# Patient Record
Sex: FEMALE | Race: BLACK OR AFRICAN AMERICAN | Employment: FULL TIME | URBAN - NONMETROPOLITAN AREA
[De-identification: names, ages, dates, MRNs, and addresses within clinical notes are randomized per-mention and may not be internally consistent; named-entity substitution may affect disease eponyms.]

---

## 2017-03-12 ENCOUNTER — NURSE TRIAGE (OUTPATIENT)
Dept: ADMINISTRATIVE | Age: 27
End: 2017-03-12

## 2017-07-20 ENCOUNTER — HOSPITAL ENCOUNTER (EMERGENCY)
Age: 27
Discharge: HOME | End: 2017-07-20
Payer: COMMERCIAL

## 2017-07-20 VITALS
BODY MASS INDEX: 29.82 KG/M2 | WEIGHT: 190 LBS | HEIGHT: 67 IN | HEART RATE: 81 BPM | SYSTOLIC BLOOD PRESSURE: 122 MMHG | OXYGEN SATURATION: 100 % | TEMPERATURE: 98.6 F | RESPIRATION RATE: 16 BRPM | DIASTOLIC BLOOD PRESSURE: 59 MMHG

## 2017-07-20 DIAGNOSIS — J01.00 ACUTE NON-RECURRENT MAXILLARY SINUSITIS: Primary | ICD-10-CM

## 2017-07-20 PROCEDURE — 99213 OFFICE O/P EST LOW 20 MIN: CPT | Performed by: NURSE PRACTITIONER

## 2017-07-20 PROCEDURE — 99214 OFFICE O/P EST MOD 30 MIN: CPT

## 2017-07-20 RX ORDER — AMOXICILLIN 875 MG/1
875 TABLET, COATED ORAL 2 TIMES DAILY
Qty: 20 TABLET | Refills: 0 | Status: SHIPPED | OUTPATIENT
Start: 2017-07-20 | End: 2017-07-30

## 2017-07-20 RX ORDER — FLUTICASONE PROPIONATE 50 MCG
2 SPRAY, SUSPENSION (ML) NASAL DAILY
Qty: 1 BOTTLE | Refills: 0 | Status: SHIPPED | OUTPATIENT
Start: 2017-07-20 | End: 2019-05-21

## 2017-07-20 ASSESSMENT — ENCOUNTER SYMPTOMS
EYE DISCHARGE: 0
BACK PAIN: 0
EYE ITCHING: 0
COUGH: 0
SWOLLEN GLANDS: 0
VOMITING: 0
DIARRHEA: 0
SHORTNESS OF BREATH: 0
CHEST TIGHTNESS: 0
TROUBLE SWALLOWING: 0
RHINORRHEA: 1
SINUS PRESSURE: 1
WHEEZING: 0
NAUSEA: 0
ABDOMINAL PAIN: 0
EYE REDNESS: 0
SORE THROAT: 0
VOICE CHANGE: 0

## 2017-07-20 ASSESSMENT — PAIN SCALES - GENERAL: PAINLEVEL_OUTOF10: 6

## 2017-07-20 ASSESSMENT — PAIN DESCRIPTION - LOCATION: LOCATION: FACE

## 2017-08-14 ENCOUNTER — APPOINTMENT (OUTPATIENT)
Dept: GENERAL RADIOLOGY | Age: 27
End: 2017-08-14
Payer: COMMERCIAL

## 2017-08-14 ENCOUNTER — HOSPITAL ENCOUNTER (EMERGENCY)
Age: 27
Discharge: HOME | End: 2017-08-15
Payer: COMMERCIAL

## 2017-08-14 VITALS
RESPIRATION RATE: 16 BRPM | HEIGHT: 67 IN | WEIGHT: 190 LBS | OXYGEN SATURATION: 100 % | DIASTOLIC BLOOD PRESSURE: 82 MMHG | TEMPERATURE: 98.7 F | HEART RATE: 80 BPM | SYSTOLIC BLOOD PRESSURE: 123 MMHG | BODY MASS INDEX: 29.82 KG/M2

## 2017-08-14 DIAGNOSIS — S61.219A LACERATION OF FINGER, INITIAL ENCOUNTER: Primary | ICD-10-CM

## 2017-08-14 PROCEDURE — 12001 RPR S/N/AX/GEN/TRNK 2.5CM/<: CPT

## 2017-08-14 PROCEDURE — 73130 X-RAY EXAM OF HAND: CPT

## 2017-08-14 PROCEDURE — 99283 EMERGENCY DEPT VISIT LOW MDM: CPT

## 2017-08-14 PROCEDURE — 96372 THER/PROPH/DIAG INJ SC/IM: CPT

## 2017-08-14 PROCEDURE — 6360000002 HC RX W HCPCS: Performed by: PHYSICIAN ASSISTANT

## 2017-08-14 RX ORDER — CEPHALEXIN 500 MG/1
500 CAPSULE ORAL 4 TIMES DAILY
Qty: 40 CAPSULE | Refills: 0 | Status: SHIPPED | OUTPATIENT
Start: 2017-08-14 | End: 2017-08-24

## 2017-08-14 RX ORDER — HYDROCODONE BITARTRATE AND ACETAMINOPHEN 5; 325 MG/1; MG/1
1 TABLET ORAL EVERY 6 HOURS PRN
Qty: 12 TABLET | Refills: 0 | Status: SHIPPED | OUTPATIENT
Start: 2017-08-14 | End: 2017-08-21

## 2017-08-14 RX ORDER — CEFAZOLIN SODIUM 1 G/3ML
1 INJECTION, POWDER, FOR SOLUTION INTRAMUSCULAR; INTRAVENOUS ONCE
Status: COMPLETED | OUTPATIENT
Start: 2017-08-15 | End: 2017-08-14

## 2017-08-14 RX ADMIN — CEFAZOLIN SODIUM 1 G: 1 INJECTION, POWDER, FOR SOLUTION INTRAMUSCULAR; INTRAVENOUS at 23:51

## 2017-08-14 ASSESSMENT — PAIN DESCRIPTION - ORIENTATION: ORIENTATION: RIGHT

## 2017-08-14 ASSESSMENT — ENCOUNTER SYMPTOMS
VOMITING: 0
EYE PAIN: 0
RHINORRHEA: 0
NAUSEA: 0
DIARRHEA: 0
COUGH: 0
SHORTNESS OF BREATH: 0
BACK PAIN: 0
EYE DISCHARGE: 0
ABDOMINAL PAIN: 0
SORE THROAT: 0
WHEEZING: 0

## 2017-08-14 ASSESSMENT — PAIN DESCRIPTION - DESCRIPTORS: DESCRIPTORS: ACHING

## 2017-08-14 ASSESSMENT — PAIN DESCRIPTION - FREQUENCY: FREQUENCY: CONTINUOUS

## 2017-08-14 ASSESSMENT — PAIN SCALES - GENERAL: PAINLEVEL_OUTOF10: 7

## 2017-08-14 ASSESSMENT — PAIN DESCRIPTION - LOCATION: LOCATION: HAND

## 2017-08-15 PROCEDURE — A6222 GAUZE <=16 IN NO W/SAL W/O B: HCPCS

## 2017-08-15 PROCEDURE — A6447 CONFORM BAND S W >=5"/YD: HCPCS

## 2019-01-10 ENCOUNTER — HOSPITAL ENCOUNTER (OUTPATIENT)
Age: 29
Setting detail: SPECIMEN
Discharge: HOME | End: 2019-01-10

## 2019-01-11 LAB
DIRECT EXAM: ABNORMAL
Lab: ABNORMAL
SPECIMEN DESCRIPTION: ABNORMAL
STATUS: ABNORMAL

## 2019-01-14 LAB
C. TRACHOMATIS DNA ,URINE: NEGATIVE
N. GONORRHOEAE DNA, URINE: NEGATIVE

## 2019-04-10 ENCOUNTER — HOSPITAL ENCOUNTER (EMERGENCY)
Age: 29
Discharge: HOME | End: 2019-04-10

## 2019-04-10 VITALS
SYSTOLIC BLOOD PRESSURE: 128 MMHG | OXYGEN SATURATION: 99 % | HEART RATE: 115 BPM | BODY MASS INDEX: 32.58 KG/M2 | TEMPERATURE: 100.9 F | DIASTOLIC BLOOD PRESSURE: 72 MMHG | WEIGHT: 208 LBS | RESPIRATION RATE: 18 BRPM

## 2019-04-10 DIAGNOSIS — Z20.818 EXPOSURE TO STREP THROAT: ICD-10-CM

## 2019-04-10 DIAGNOSIS — J10.1 INFLUENZA A: Primary | ICD-10-CM

## 2019-04-10 LAB
FLU A ANTIGEN: POSITIVE
INFLUENZA B AG, EIA: NEGATIVE

## 2019-04-10 PROCEDURE — 87804 INFLUENZA ASSAY W/OPTIC: CPT

## 2019-04-10 PROCEDURE — 99214 OFFICE O/P EST MOD 30 MIN: CPT | Performed by: NURSE PRACTITIONER

## 2019-04-10 PROCEDURE — 99213 OFFICE O/P EST LOW 20 MIN: CPT

## 2019-04-10 RX ORDER — ONDANSETRON 4 MG/1
4 TABLET, FILM COATED ORAL EVERY 8 HOURS PRN
Qty: 15 TABLET | Refills: 0 | Status: SHIPPED | OUTPATIENT
Start: 2019-04-10 | End: 2019-05-21

## 2019-04-10 RX ORDER — OSELTAMIVIR PHOSPHATE 75 MG/1
75 CAPSULE ORAL 2 TIMES DAILY
Qty: 10 CAPSULE | Refills: 0 | Status: SHIPPED | OUTPATIENT
Start: 2019-04-10 | End: 2019-04-10 | Stop reason: SDUPTHER

## 2019-04-10 RX ORDER — OSELTAMIVIR PHOSPHATE 75 MG/1
75 CAPSULE ORAL 2 TIMES DAILY
Qty: 10 CAPSULE | Refills: 0 | Status: SHIPPED | OUTPATIENT
Start: 2019-04-10 | End: 2019-04-15

## 2019-04-10 RX ORDER — BENZONATATE 200 MG/1
200 CAPSULE ORAL 3 TIMES DAILY PRN
Qty: 21 CAPSULE | Refills: 0 | Status: SHIPPED | OUTPATIENT
Start: 2019-04-10 | End: 2019-04-17

## 2019-04-10 RX ORDER — FLUTICASONE PROPIONATE 50 MCG
1 SPRAY, SUSPENSION (ML) NASAL DAILY
Qty: 1 BOTTLE | Refills: 0 | Status: SHIPPED | OUTPATIENT
Start: 2019-04-10 | End: 2019-05-21

## 2019-04-10 RX ORDER — AMOXICILLIN 500 MG/1
500 CAPSULE ORAL 2 TIMES DAILY
Qty: 20 CAPSULE | Refills: 0 | Status: SHIPPED | OUTPATIENT
Start: 2019-04-10 | End: 2019-04-20

## 2019-04-10 ASSESSMENT — ENCOUNTER SYMPTOMS
RHINORRHEA: 1
VOICE CHANGE: 0
NAUSEA: 1
COUGH: 1
CHEST TIGHTNESS: 0
VOMITING: 0
SORE THROAT: 1
STRIDOR: 0
TROUBLE SWALLOWING: 0
SINUS PRESSURE: 0
ANAL BLEEDING: 0
WHEEZING: 0
SHORTNESS OF BREATH: 0

## 2019-04-10 ASSESSMENT — PAIN DESCRIPTION - PROGRESSION: CLINICAL_PROGRESSION: GRADUALLY WORSENING

## 2019-04-10 ASSESSMENT — PAIN SCALES - GENERAL: PAINLEVEL_OUTOF10: 2

## 2019-04-10 ASSESSMENT — PAIN - FUNCTIONAL ASSESSMENT: PAIN_FUNCTIONAL_ASSESSMENT: ACTIVITIES ARE NOT PREVENTED

## 2019-04-10 ASSESSMENT — PAIN DESCRIPTION - FREQUENCY: FREQUENCY: CONTINUOUS

## 2019-04-10 ASSESSMENT — PAIN DESCRIPTION - PAIN TYPE: TYPE: ACUTE PAIN

## 2019-04-10 ASSESSMENT — PAIN DESCRIPTION - LOCATION: LOCATION: THROAT

## 2019-04-10 ASSESSMENT — PAIN DESCRIPTION - ONSET: ONSET: GRADUAL

## 2019-04-10 ASSESSMENT — PAIN DESCRIPTION - DESCRIPTORS: DESCRIPTORS: ACHING

## 2019-04-10 NOTE — ED PROVIDER NOTES
mouth daily      !! fluticasone (FLONASE) 50 MCG/ACT nasal spray 2 sprays by Nasal route daily Apply daily to each nare, Disp-1 Bottle, R-0Normal       !! - Potential duplicate medications found. Please discuss with provider. ALLERGIES     Patient is has No Known Allergies. FAMILY HISTORY     Patient's family history includes High Blood Pressure in her father. SOCIAL HISTORY     Patient  reports that she quit smoking about 6 years ago. Her smoking use included cigarettes. She has never used smokeless tobacco. She reports that she does not drink alcohol or use drugs. PHYSICAL EXAM     ED TRIAGE VITALS  BP: 128/72, Temp: 100.9 °F (38.3 °C), Pulse: 115, Resp: 18, SpO2: 99 %  Physical Exam   Constitutional: She is oriented to person, place, and time. She appears well-developed and well-nourished. Non-toxic appearance. She appears ill. No distress. HENT:   Head: Normocephalic and atraumatic. Head is without right periorbital erythema and without left periorbital erythema. Right Ear: Hearing, tympanic membrane, external ear and ear canal normal.   Left Ear: Hearing, tympanic membrane, external ear and ear canal normal.   Nose: Nose normal. Right sinus exhibits no maxillary sinus tenderness and no frontal sinus tenderness. Left sinus exhibits no maxillary sinus tenderness and no frontal sinus tenderness. Mouth/Throat: Uvula is midline and mucous membranes are normal. No trismus in the jaw. No uvula swelling. Posterior oropharyngeal erythema (mild) present. No oropharyngeal exudate, posterior oropharyngeal edema or tonsillar abscesses. Tonsils are 2+ on the right. Tonsils are 2+ on the left. No tonsillar exudate. Neck: Normal range of motion. Neck supple. Cardiovascular: S1 normal, S2 normal and normal heart sounds. Tachycardia present. Exam reveals no gallop and no friction rub. No murmur heard. Pulmonary/Chest: Effort normal and breath sounds normal. No accessory muscle usage or stridor.  No symptoms persist or if develops new symptoms such as wheezing or shortness of breath. PATIENT REFERRED TO:  [facility]  [address]  [phone]    As needed    Patient instructed to follow up with PCP. If symptoms worsen, become severe or new symptoms develop patient instructedto go to the emergency room immediately. DISCHARGE MEDICATIONS:  Discharge Medication List as of 4/10/2019  5:03 PM      START taking these medications    Details   benzonatate (TESSALON) 200 MG capsule Take 1 capsule by mouth 3 times daily as needed for Cough, Disp-21 capsule, R-0Normal      ondansetron (ZOFRAN) 4 MG tablet Take 1 tablet by mouth every 8 hours as needed for Nausea or Vomiting, Disp-15 tablet, R-0Normal      !! fluticasone (FLONASE) 50 MCG/ACT nasal spray 1 spray by Nasal route daily Apply daily to each nare, Disp-1 Bottle, R-0Normal      amoxicillin (AMOXIL) 500 MG capsule Take 1 capsule by mouth 2 times daily for 10 days, Disp-20 capsule, R-0Normal      oseltamivir (TAMIFLU) 75 MG capsule Take 1 capsule by mouth 2 times daily for 5 days, Disp-10 capsule, R-0Print       !! - Potential duplicate medications found. Please discuss with provider. Discharge Medication List as of 4/10/2019  5:03 PM          Patient giveneducational materials - see patient instructions. Discussed use, benefit, and side effects of prescribed medications. All patient questions answered. Pt voiced understanding. Reviewed health maintenance. Patient agreedwith treatment plan. Follow up as directed.      DARCIE Yan - DARCIE Gee CNP  04/17/19 2184

## 2019-04-10 NOTE — ED TRIAGE NOTES
Ambulates to room with c/o a sore throat, headache, body aches and fever for 2 days. Pt reports that her son had strep throat a week 1/2 ago.

## 2019-04-10 NOTE — LETTER
[facility]  [address] [id number]  Phone: [phone]    No name on file. April 10, 2019     Patient: Beverly Villegas   YOB: 1990   Date of Visit: 4/10/2019       To Whom It May Concern: It is my medical opinion that Donnie Craft may return to work on 4-12-19. If you have any questions or concerns, please don't hesitate to call. Sincerely,    Electronically signed by DARCIE Yan CNP on 4/10/2019 at 4:53 PM    No name on file.

## 2019-04-15 NOTE — ED NOTES
Pt calls into urgent care and states that she still had a fever on 4/13/2019 and requested a slip off of work for that day. Spoke with Riley, verbalized to nurse that nurse can give pt a slip for 4/13/2019 off and back to work on 4/14/2019. Slip given to pt.       Dae Lewis RN  04/15/19 2872

## 2019-05-21 ENCOUNTER — HOSPITAL ENCOUNTER (EMERGENCY)
Age: 29
Discharge: HOME | End: 2019-05-21

## 2019-05-21 ENCOUNTER — HOSPITAL ENCOUNTER (OUTPATIENT)
Age: 29
Setting detail: SPECIMEN
Discharge: HOME | End: 2019-05-21

## 2019-05-21 VITALS
HEART RATE: 78 BPM | SYSTOLIC BLOOD PRESSURE: 123 MMHG | BODY MASS INDEX: 28.93 KG/M2 | DIASTOLIC BLOOD PRESSURE: 78 MMHG | TEMPERATURE: 98.3 F | HEIGHT: 66 IN | OXYGEN SATURATION: 98 % | RESPIRATION RATE: 16 BRPM | WEIGHT: 180 LBS

## 2019-05-21 DIAGNOSIS — Z20.2 EXPOSURE TO GENITAL HERPES: Primary | ICD-10-CM

## 2019-05-21 DIAGNOSIS — Z32.02 NEGATIVE PREGNANCY TEST: ICD-10-CM

## 2019-05-21 LAB — PREGNANCY, URINE: NEGATIVE

## 2019-05-21 PROCEDURE — 84703 CHORIONIC GONADOTROPIN ASSAY: CPT

## 2019-05-21 PROCEDURE — 99213 OFFICE O/P EST LOW 20 MIN: CPT | Performed by: NURSE PRACTITIONER

## 2019-05-21 PROCEDURE — 99213 OFFICE O/P EST LOW 20 MIN: CPT

## 2019-05-21 RX ORDER — LEVONORGESTREL AND ETHINYL ESTRADIOL 0.15-0.03
KIT ORAL
Refills: 0 | COMMUNITY
Start: 2019-02-23 | End: 2020-12-09

## 2019-05-21 RX ORDER — ACYCLOVIR 400 MG/1
400 TABLET ORAL 3 TIMES DAILY
Qty: 21 TABLET | Refills: 0 | Status: SHIPPED | OUTPATIENT
Start: 2019-05-21 | End: 2019-05-28

## 2019-05-21 ASSESSMENT — ENCOUNTER SYMPTOMS
DIARRHEA: 0
SHORTNESS OF BREATH: 0
VOMITING: 0
ABDOMINAL PAIN: 0
CHEST TIGHTNESS: 0
NAUSEA: 0

## 2019-05-21 NOTE — ED PROVIDER NOTES
[id number]       Chief Complaint   Patient presents with    Exposure to STD       Nurses Notes reviewed and I agree except as noted in the HPI. HISTORY OF PRESENT ILLNESS   Curtis Ramachandran is a 34 y.o. female who presents for evaluation after exposure to genital herpes from her male partner who was just diagnosed and is being treated. She denies a history of genital herpes. She denies any active lesions, lumps, or bumps. She denies concern for Chlamydia/gonorrhea, or Trichomonas and therefore declines testing for those. REVIEW OF SYSTEMS     Review of Systems   Constitutional: Negative for chills, fatigue and fever. Eyes: Negative for visual disturbance. Respiratory: Negative for chest tightness and shortness of breath. Cardiovascular: Negative for chest pain. Gastrointestinal: Negative for abdominal pain, diarrhea, nausea and vomiting. Genitourinary: Negative for dysuria, frequency, genital sores, hematuria, pelvic pain, vaginal bleeding and vaginal discharge. Musculoskeletal: Negative for joint swelling. Skin: Negative for rash. Allergic/Immunologic: Negative for environmental allergies and food allergies. Neurological: Negative for headaches. Psychiatric/Behavioral: The patient is not nervous/anxious. PAST MEDICAL HISTORY         Diagnosis Date    Hypertension     with pregnancy    Sickle cell anemia ([facility] [address]     Has trait       SURGICAL HISTORY     Patient  has no past surgical history on file. CURRENT MEDICATIONS       Discharge Medication List as of 5/21/2019 11:23 AM      CONTINUE these medications which have NOT CHANGED    Details   SETLAKIN 0.15-0.03 MG per tablet R-0, DAWHistorical Med             ALLERGIES     Patient is has No Known Allergies. FAMILY HISTORY     Patient'sfamily history includes High Blood Pressure in her father.     SOCIAL HISTORY     Patient  reports that she quit smoking lesions. [facility]  [facility]                Address: [address], [facility], [address]   [phone]  Regarding syphilis and HIV testing as per [facility]'S [place]OR[place] guidelines  [address] [facility]                                                               [phone]    PATIENT REFERRED TO:  [address] [facility]  [address]  [phone]    As needed, If symptoms worsen, [address]    [facility]  [place] [address]  [address] [id number] [phone]    Schedule an appointment as soon as possible for a visit in 3 days  for further evalation in [address]. [facility]  Schedule an appointment as soon as possible for a visit in 3 days  if you do not have a family provider    DISCHARGE MEDICATIONS:  Discharge Medication List as of 5/21/2019 11:23 AM      START taking these medications    Details   acyclovir (ZOVIRAX) 400 MG tablet Take 1 tablet by mouth 3 times daily for 7 days Do not take unless there are active lesions. , Disp-21 tablet, R-0Print           Discharge Medication List as of 5/21/2019 11:23 AM          Azalea Kingsley, [address], APRN - CNP  05/21/19 1134

## 2019-05-21 NOTE — ED TRIAGE NOTES
Lillie Hall arrives by self  to room with complaint of exsposure to herpes simplex by sexual partner. Pt would like to be tested for herpes. Pt has no lesions at this time.

## 2019-05-23 LAB
HERPES SIMPLEX VIRUS 1 IGG: 0.43
HERPES SIMPLEX VIRUS 2 IGG: 6.73
HERPES TYPE 1/2 IGM COMBINED: 1

## 2020-02-03 ENCOUNTER — HOSPITAL ENCOUNTER (EMERGENCY)
Age: 30
Discharge: HOME | End: 2020-02-03

## 2020-02-03 VITALS
WEIGHT: 190 LBS | HEIGHT: 67 IN | RESPIRATION RATE: 16 BRPM | HEART RATE: 93 BPM | DIASTOLIC BLOOD PRESSURE: 72 MMHG | TEMPERATURE: 99 F | BODY MASS INDEX: 29.82 KG/M2 | OXYGEN SATURATION: 98 % | SYSTOLIC BLOOD PRESSURE: 134 MMHG

## 2020-02-03 PROCEDURE — 99214 OFFICE O/P EST MOD 30 MIN: CPT

## 2020-02-03 PROCEDURE — 99213 OFFICE O/P EST LOW 20 MIN: CPT | Performed by: NURSE PRACTITIONER

## 2020-02-03 RX ORDER — OSELTAMIVIR PHOSPHATE 75 MG/1
75 CAPSULE ORAL DAILY
Qty: 10 CAPSULE | Refills: 0 | Status: SHIPPED | OUTPATIENT
Start: 2020-02-03 | End: 2020-02-13

## 2020-02-03 ASSESSMENT — ENCOUNTER SYMPTOMS
WHEEZING: 0
STRIDOR: 0
COUGH: 1
SINUS PAIN: 0
DIARRHEA: 0
SINUS PRESSURE: 0
SORE THROAT: 0
SHORTNESS OF BREATH: 0
CHEST TIGHTNESS: 0
VOMITING: 0
NAUSEA: 0

## 2020-02-03 ASSESSMENT — PAIN SCALES - GENERAL: PAINLEVEL_OUTOF10: 8

## 2020-02-03 ASSESSMENT — PAIN DESCRIPTION - ONSET: ONSET: ON-GOING

## 2020-02-03 ASSESSMENT — PAIN DESCRIPTION - PROGRESSION: CLINICAL_PROGRESSION: GRADUALLY WORSENING

## 2020-02-03 ASSESSMENT — PAIN DESCRIPTION - PAIN TYPE: TYPE: ACUTE PAIN

## 2020-02-03 ASSESSMENT — PAIN DESCRIPTION - LOCATION: LOCATION: GENERALIZED

## 2020-02-03 ASSESSMENT — PAIN DESCRIPTION - DESCRIPTORS: DESCRIPTORS: ACHING

## 2020-02-03 ASSESSMENT — PAIN DESCRIPTION - FREQUENCY: FREQUENCY: CONTINUOUS

## 2020-02-03 NOTE — ED NOTES
Patient discharge instructions given to pt and pt verbalized understanding, 1 px given, no other needs at this time, and pt left in stable condition.      Ran Herrera RN  02/03/20 8711

## 2020-02-03 NOTE — ED PROVIDER NOTES
Positive for cough. Negative for chest tightness, shortness of breath, wheezing and stridor. Cardiovascular: Negative for chest pain. Gastrointestinal: Negative for diarrhea, nausea and vomiting. Musculoskeletal: Positive for myalgias. Negative for arthralgias, neck pain and neck stiffness. Skin: Negative for rash and wound. Neurological: Positive for headaches. Negative for dizziness, weakness, light-headedness and numbness. PAST MEDICAL HISTORY         Diagnosis Date    Hypertension     with pregnancy    Sickle cell anemia ([facility] [address]     Has trait       SURGICALHISTORY     Patient  has no past surgical history on file. CURRENT MEDICATIONS       Discharge Medication List as of 2/3/2020 11:44 AM      CONTINUE these medications which have NOT CHANGED    Details   SETLAKIN 0.15-0.03 MG per tablet R-0, DAWHistorical Med             ALLERGIES     Patient is has No Known Allergies. Patients   Immunization History   Administered Date(s) Administered    Tdap (Boostrix, Adacel) 07/16/2015       FAMILY HISTORY     Patient's family history includes High Blood Pressure in her father. SOCIAL HISTORY     Patient  reports that she quit smoking about 7 years ago. Her smoking use included cigarettes. She has never used smokeless tobacco. She reports that she does not drink alcohol or use drugs. PHYSICAL EXAM     ED TRIAGE VITALS  BP: 134/72, Temp: 99 °F (37.2 °C), Pulse: 93, Resp: 16, SpO2: 98 %,Estimated body mass index is 30.21 kg/m² as calculated from the following:    Height as of this encounter: 5' 6.5\" (1.689 m). Weight as of this encounter: 190 lb (86.2 kg). ,No LMP recorded. Physical Exam  Constitutional:       General: She is not in acute distress. Appearance: Normal appearance. She is not ill-appearing, toxic-appearing or diaphoretic. HENT:      Nose: Nose normal.      Mouth/Throat:      Mouth: Mucous membranes are moist.      Pharynx: No posterior oropharyngeal erythema. on file.       DISCHARGE MEDICATIONS:  Discharge Medication List as of 2/3/2020 11:44 AM      START taking these medications    Details   oseltamivir (TAMIFLU) 75 MG capsule Take 1 capsule by mouth daily for 10 days, Disp-10 capsule, R-0Print             Discharge Medication List as of 2/3/2020 11:44 AM          Discharge Medication List as of 2/3/2020 11:44 AM          DARCIE Pastrana NP    (Please note that portions of this note were completed with a voice recognition program. Efforts were made to edit the dictations but occasionally words are mis-transcribed.)         DARCIE Dugan NP  02/03/20 4311

## 2020-02-03 NOTE — ED TRIAGE NOTES
Pt to urgent care due to flu like symptoms. New onset of symptoms started last night. Pt's son had tested positive for influenza B roughly 3 days ago.

## 2020-02-06 ENCOUNTER — HOSPITAL ENCOUNTER (EMERGENCY)
Age: 30
Discharge: HOME | End: 2020-02-06

## 2020-02-06 VITALS
BODY MASS INDEX: 30.21 KG/M2 | RESPIRATION RATE: 15 BRPM | TEMPERATURE: 99.1 F | DIASTOLIC BLOOD PRESSURE: 80 MMHG | HEART RATE: 82 BPM | OXYGEN SATURATION: 99 % | WEIGHT: 190 LBS | SYSTOLIC BLOOD PRESSURE: 124 MMHG

## 2020-02-06 LAB
ANION GAP SERPL CALCULATED.3IONS-SCNC: 14 MEQ/L (ref 8–16)
BASOPHILS # BLD: 0.3 %
BASOPHILS ABSOLUTE: 0 THOU/MM3 (ref 0–0.1)
BUN BLDV-MCNC: 6 MG/DL (ref 7–22)
CALCIUM SERPL-MCNC: 8.6 MG/DL (ref 8.5–10.5)
CHLORIDE BLD-SCNC: 101 MEQ/L (ref 98–111)
CO2: 24 MEQ/L (ref 23–33)
CREAT SERPL-MCNC: 0.7 MG/DL (ref 0.4–1.2)
EOSINOPHIL # BLD: 0 %
EOSINOPHILS ABSOLUTE: 0 THOU/MM3 (ref 0–0.4)
ERYTHROCYTE [DISTWIDTH] IN BLOOD BY AUTOMATED COUNT: 13 % (ref 11.5–14.5)
ERYTHROCYTE [DISTWIDTH] IN BLOOD BY AUTOMATED COUNT: 41.9 FL (ref 35–45)
FLU A ANTIGEN: NEGATIVE
FLU B ANTIGEN: POSITIVE
GFR SERPL CREATININE-BSD FRML MDRD: > 90 ML/MIN/1.73M2
GLUCOSE BLD-MCNC: 114 MG/DL (ref 70–108)
GROUP A STREP CULTURE, REFLEX: NEGATIVE
HCT VFR BLD CALC: 37.9 % (ref 37–47)
HEMOGLOBIN: 12.6 GM/DL (ref 12–16)
IMMATURE GRANS (ABS): 0 THOU/MM3 (ref 0–0.07)
IMMATURE GRANULOCYTES: 0 %
LYMPHOCYTES # BLD: 36.4 %
LYMPHOCYTES ABSOLUTE: 1.1 THOU/MM3 (ref 1–4.8)
MCH RBC QN AUTO: 29.1 PG (ref 26–33)
MCHC RBC AUTO-ENTMCNC: 33.2 GM/DL (ref 32.2–35.5)
MCV RBC AUTO: 87.5 FL (ref 81–99)
MONOCYTES # BLD: 18.2 %
MONOCYTES ABSOLUTE: 0.6 THOU/MM3 (ref 0.4–1.3)
NUCLEATED RED BLOOD CELLS: 0 /100 WBC
OSMOLALITY CALCULATION: 276 MOSMOL/KG (ref 275–300)
PLATELET # BLD: 197 THOU/MM3 (ref 130–400)
PMV BLD AUTO: 10.1 FL (ref 9.4–12.4)
POTASSIUM SERPL-SCNC: 3.8 MEQ/L (ref 3.5–5.2)
PREGNANCY, SERUM: NEGATIVE
RBC # BLD: 4.33 MILL/MM3 (ref 4.2–5.4)
REFLEX THROAT C + S: NORMAL
SEG NEUTROPHILS: 45.1 %
SEGMENTED NEUTROPHILS ABSOLUTE COUNT: 1.4 THOU/MM3 (ref 1.8–7.7)
SODIUM BLD-SCNC: 139 MEQ/L (ref 135–145)
WBC # BLD: 3.1 THOU/MM3 (ref 4.8–10.8)

## 2020-02-06 PROCEDURE — 99282 EMERGENCY DEPT VISIT SF MDM: CPT

## 2020-02-06 PROCEDURE — 87804 INFLUENZA ASSAY W/OPTIC: CPT

## 2020-02-06 PROCEDURE — 6370000000 HC RX 637 (ALT 250 FOR IP): Performed by: PHYSICIAN ASSISTANT

## 2020-02-06 PROCEDURE — 36415 COLL VENOUS BLD VENIPUNCTURE: CPT

## 2020-02-06 PROCEDURE — 85025 COMPLETE CBC W/AUTO DIFF WBC: CPT

## 2020-02-06 PROCEDURE — 84703 CHORIONIC GONADOTROPIN ASSAY: CPT

## 2020-02-06 PROCEDURE — 80048 BASIC METABOLIC PNL TOTAL CA: CPT

## 2020-02-06 PROCEDURE — 87880 STREP A ASSAY W/OPTIC: CPT

## 2020-02-06 PROCEDURE — 87070 CULTURE OTHR SPECIMN AEROBIC: CPT

## 2020-02-06 RX ORDER — ONDANSETRON 4 MG/1
4 TABLET, ORALLY DISINTEGRATING ORAL EVERY 8 HOURS PRN
Qty: 20 TABLET | Refills: 0 | Status: SHIPPED | OUTPATIENT
Start: 2020-02-06 | End: 2020-12-09 | Stop reason: ALTCHOICE

## 2020-02-06 RX ORDER — ONDANSETRON 4 MG/1
4 TABLET, ORALLY DISINTEGRATING ORAL ONCE
Status: COMPLETED | OUTPATIENT
Start: 2020-02-06 | End: 2020-02-06

## 2020-02-06 RX ADMIN — ONDANSETRON 4 MG: 4 TABLET, ORALLY DISINTEGRATING ORAL at 17:02

## 2020-02-06 ASSESSMENT — PAIN DESCRIPTION - PAIN TYPE: TYPE: ACUTE PAIN

## 2020-02-06 ASSESSMENT — ENCOUNTER SYMPTOMS
EYE DISCHARGE: 0
COUGH: 1
EYE ITCHING: 0
RHINORRHEA: 0
EYE PAIN: 0
WHEEZING: 0
BACK PAIN: 0
DIARRHEA: 1
SORE THROAT: 0
VOMITING: 1
NAUSEA: 1
SHORTNESS OF BREATH: 0
ABDOMINAL PAIN: 0
COLOR CHANGE: 0

## 2020-02-06 ASSESSMENT — PAIN SCALES - GENERAL: PAINLEVEL_OUTOF10: 3

## 2020-02-06 NOTE — ED NOTES
Presents with complaints of not feeling better. States that she was treated for the flu a couple of days ago due to her child testing positive. States that she started getting some diarrhea after starting the tamiflu. States that she also has a sore throat.      Le Rebolledo RN  02/06/20 0428

## 2020-02-06 NOTE — ED PROVIDER NOTES
[address] COMPLAINT       Chief Complaint   Patient presents with    URI       Nurses Notes reviewed and I agree except as notedin the HPI. HISTORY OF PRESENT ILLNESS    Jass Fairbanks is a 34 y.o. female who presents has been ill since February 1. The patient states that on the third she was started on Tamiflu because her son tested positive for influenza. The patient says that she got diarrhea after that and is vomited once today. She denies any abdominal pain. She denies any fever or chills. She does have some body aches still. No chest pain or shortness of breath. Location/Symptom: diarrhea  Timing/Onset: Feb 3rd  Context/Setting: home  Quality: none  Duration: off and on  Modifying Factors: none  Severity: none    REVIEW OF SYSTEMS     Review of Systems   Constitutional: Negative for activity change, appetite change, chills and fever. HENT: Positive for congestion. Negative for ear pain, rhinorrhea and sore throat. Eyes: Negative for pain, discharge and itching. Respiratory: Positive for cough. Negative for shortness of breath and wheezing. Cardiovascular: Negative for chest pain. Gastrointestinal: Positive for diarrhea, nausea and vomiting. Negative for abdominal pain. Genitourinary: Negative for difficulty urinating and dysuria. Musculoskeletal: Negative for arthralgias, back pain and myalgias. Skin: Negative for color change and rash. Neurological: Negative for dizziness, seizures, light-headedness and headaches. Psychiatric/Behavioral: Negative for agitation, confusion, self-injury and suicidal ideas. All other systems reviewed and are negative. PAST MEDICAL HISTORY    has a past medical history of Hypertension and Sickle cell anemia ([facility] [address] SURGICAL HISTORY      has no past surgical history on file.     CURRENT MEDICATIONS       Discharge Medication List as of 2/6/2020  6:02 PM      CONTINUE these medications which have NOT CHANGED    Details   oseltamivir (TAMIFLU) 75 MG capsule Take 1 capsule by mouth daily for 10 days, Disp-10 capsule, R-0Print      SETLAKIN 0.15-0.03 MG per tablet R-0, DAWHistorical Med             ALLERGIES     has No Known Allergies. HISTORY     She indicated that her mother is alive. She indicated that her father is alive. She indicated that her sister is alive. She indicated that her brother is alive. family history includes High Blood Pressure in her father. SOCIALHISTORY      reports that she quit smoking about 7 years ago. Her smoking use included cigarettes. She has never used smokeless tobacco. She reports that she does not drink alcohol or use drugs. PHYSICAL EXAM     INITIAL VITALS:  weight is 190 lb (86.2 kg). Her oral temperature is 99.1 °F (37.3 °C). Her blood pressure is 124/80 and her pulse is 82. Her respiration is 15 and oxygen saturation is 99%. Physical Exam  Vitals signs and nursing note reviewed. Constitutional:       Comments: Well Developed Well Nourished Appearing     HENT:      Head: Normocephalic and atraumatic. Eyes:      Pupils: Pupils are equal, round, and reactive to light. Neck:      Musculoskeletal: Normal range of motion and neck supple. Cardiovascular:      Rate and Rhythm: Normal rate and regular rhythm. Heart sounds: Normal heart sounds. Pulmonary:      Effort: Pulmonary effort is normal. No respiratory distress. Breath sounds: Normal breath sounds. No wheezing. Abdominal:      General: Bowel sounds are normal. There is no distension. Palpations: Abdomen is soft. Comments: Abdomen was soft nontender          DIFFERENTIAL DIAGNOSIS:   Viral syndrome. Will give some Zofran and check some labs for reassurance.     DIAGNOSTIC RESULTS     EKG: All EKG's are interpreted by the Emergency Department Physician who either signs or Co-signs this chart in the absence of a cardiologist.      RADIOLOGY: non-plain film images(s) such as CT, Ultrasound and MRI are read by the radiologist.  None      LABS:   Labs Reviewed   RAPID INFLUENZA A/B ANTIGENS - Abnormal; Notable for the following components:       Result Value    Flu B Antigen POSITIVE (*)     All other components within normal limits   CBC WITH AUTO DIFFERENTIAL - Abnormal; Notable for the following components:    WBC 3.1 (*)     Segs Absolute 1.4 (*)     All other components within normal limits   BASIC METABOLIC PANEL - Abnormal; Notable for the following components:    Glucose 114 (*)     BUN 6 (*)     All other components within normal limits   THROAT CULTURE    Narrative:     Source: throat       Site: swab          Current Antibiotics: not stated   GROUP A STREP, REFLEX   HCG, SERUM, QUALITATIVE   ANION GAP   GLOMERULAR FILTRATION RATE, ESTIMATED   OSMOLALITY       EMERGENCY DEPARTMENT COURSE:   :    Vitals:    02/06/20 1650   BP: 124/80   Pulse: 82   Resp: 15   Temp: 99.1 °F (37.3 °C)   TempSrc: Oral   SpO2: 99%   Weight: 190 lb (86.2 kg)     Patient was seen history physical exam was performed. Patient was given Zofran here and offered p.o. fluids. Patient tolerated p.o. fluids. Will discharge. See disposition below    CRITICAL CARE:  None    CONSULTS:  None    PROCEDURES:  None    FINAL IMPRESSION      1. Influenza          DISPOSITION/PLAN   Discharge    PATIENT REFERRED TO:  [address] [place] [address],[address]  [facility]. [facility]  In 2 days        DISCHARGE MEDICATIONS:  Discharge Medication List as of 2/6/2020  6:02 PM      START taking these medications    Details   ondansetron (ZOFRAN ODT) 4 MG disintegrating tablet Take 1 tablet by mouth every 8 hours as needed for Nausea, Disp-20 tablet, R-0Print             (Please note that portions of this note were completed with a voice recognitionprogram.  Efforts were made to edit the dictations but occasionally words are mis-transcribed.)    Charline Yanes [address]           [facility] C[facility] [address], [place]  02/07/20 2377

## 2020-02-06 NOTE — LETTER
[facility] [address] [address] EMERGENCY DEPT  [address] [place] [address]  Phone: [phone]               February 6, 2020    Patient: Brittany Nguyen   YOB: 1990   Date of Visit: 2/6/2020       To Whom It May Concern:    Quentin Carvajal was seen and treated in our emergency department on 2/6/2020. She may return to work on 02/07/2020.       Sincerely,       Caryle Parkins, PA         Signature:__________________________________

## 2020-02-08 LAB — THROAT/NOSE CULTURE: NORMAL

## 2020-09-18 ENCOUNTER — HOSPITAL ENCOUNTER (OUTPATIENT)
Age: 30
Setting detail: SPECIMEN
Discharge: HOME | End: 2020-09-18
Payer: MEDICAID

## 2020-09-18 LAB
DIRECT EXAM: NORMAL
Lab: NORMAL
SPECIMEN DESCRIPTION: NORMAL

## 2020-09-19 LAB
SOURCE: NORMAL
TRICHOMONAS VAGINALI, MOLECULAR: NEGATIVE

## 2020-09-21 LAB
C. TRACHOMATIS DNA ,URINE: NEGATIVE
N. GONORRHOEAE DNA, URINE: NEGATIVE
SPECIMEN DESCRIPTION: NORMAL

## 2020-12-09 ENCOUNTER — HOSPITAL ENCOUNTER (EMERGENCY)
Age: 30
Discharge: HOME | End: 2020-12-09

## 2020-12-09 VITALS
RESPIRATION RATE: 16 BRPM | OXYGEN SATURATION: 98 % | HEART RATE: 81 BPM | SYSTOLIC BLOOD PRESSURE: 134 MMHG | TEMPERATURE: 98 F | DIASTOLIC BLOOD PRESSURE: 70 MMHG

## 2020-12-09 PROCEDURE — 99213 OFFICE O/P EST LOW 20 MIN: CPT

## 2020-12-09 PROCEDURE — 99213 OFFICE O/P EST LOW 20 MIN: CPT | Performed by: NURSE PRACTITIONER

## 2020-12-09 RX ORDER — M-VIT,TX,IRON,MINS/CALC/FOLIC 27MG-0.4MG
1 TABLET ORAL DAILY
COMMUNITY
End: 2021-05-27

## 2020-12-09 ASSESSMENT — ENCOUNTER SYMPTOMS
ABDOMINAL PAIN: 1
DIARRHEA: 0
CONSTIPATION: 0
COUGH: 0
VOMITING: 0
SHORTNESS OF BREATH: 0
NAUSEA: 0
SORE THROAT: 0

## 2020-12-09 NOTE — ED TRIAGE NOTES
Called off last night from work, because of stomach pain  , no pain today, was told by work needed note for the  2 days off

## 2020-12-09 NOTE — ED NOTES
Pt. Released in stable condition, ambulated per self to private car. Instructed pt to follow-up with family doctor as needed for recheck or go directly to the emergency department for any concerns/worsening conditions. Pt. Verbalized understanding of instructions. No questions at this time.      Jorge Cadena RN  12/09/20 3931

## 2020-12-09 NOTE — ED PROVIDER NOTES
[facility]  Urgent Care Encounter       CHIEF COMPLAINT       Chief Complaint   Patient presents with    Abdominal Pain       Nurses Notes reviewed and I agree except as noted in the HPI. HISTORY OF PRESENT ILLNESS   Heather Allen is a 31 y.o. female who presents for evaluation of abdominal pain that occurred last night but has now improved today. Patient states that she had some abdominal cramping and pain that caused her to call into work. She states that she had 2 bowel movements and the pain improved, however her work would not let her return without a note. Patient states that she is required to take a \"medical absence\" as she is currently out of paid days off and states that she cannot return to work until the night of the 11th. Patient denies any other issues or concerns at this time. The history is provided by the patient. REVIEW OF SYSTEMS     Review of Systems   Constitutional: Negative for chills and fever. HENT: Negative for congestion and sore throat. Respiratory: Negative for cough and shortness of breath. Cardiovascular: Negative for chest pain. Gastrointestinal: Positive for abdominal pain (resloved). Negative for constipation, diarrhea, nausea and vomiting. Genitourinary: Negative for menstrual problem, vaginal bleeding and vaginal discharge. Musculoskeletal: Negative for arthralgias and myalgias. Skin: Negative for rash. Neurological: Negative for headaches. PAST MEDICAL HISTORY         Diagnosis Date    Hypertension     with pregnancy    Sickle cell anemia ([facility] [address]     Has trait       SURGICALHISTORY     Patient  has no past surgical history on file. CURRENT MEDICATIONS       Previous Medications    MULTIPLE VITAMINS-MINERALS (THERAPEUTIC MULTIVITAMIN-MINERALS) TABLET    Take 1 tablet by mouth daily       ALLERGIES     Patient is has No Known Allergies.     Patients   Immunization History   Administered Date(s) Administered  Tdap (Boostrix, Adacel) 07/16/2015       FAMILY HISTORY     Patient's family history includes High Blood Pressure in her father. SOCIAL HISTORY     Patient  reports that she quit smoking about 7 years ago. Her smoking use included cigarettes. She has never used smokeless tobacco. She reports that she does not drink alcohol or use drugs. PHYSICAL EXAM     ED TRIAGE VITALS  BP: 134/70, Temp: 98 °F (36.7 °C), Pulse: 81, Resp: 16, SpO2: 98 %,Estimated body mass index is 30.21 kg/m² as calculated from the following:    Height as of 2/3/20: 5' 6.5\" (1.689 m). Weight as of 2/6/20: 190 lb (86.2 kg). ,Patient's last menstrual period was 11/28/2020. Physical Exam  Vitals signs and nursing note reviewed. Constitutional:       General: She is not in acute distress. Appearance: She is well-developed. She is not diaphoretic. Eyes:      Conjunctiva/sclera:      Right eye: Right conjunctiva is not injected. Left eye: Left conjunctiva is not injected. Pupils: Pupils are equal.   Neck:      Musculoskeletal: Normal range of motion. Cardiovascular:      Rate and Rhythm: Normal rate and regular rhythm. Heart sounds: No murmur. Pulmonary:      Effort: Pulmonary effort is normal. No respiratory distress. Breath sounds: Normal breath sounds. Abdominal:      General: Bowel sounds are normal.      Palpations: Abdomen is soft. Tenderness: There is no abdominal tenderness. Musculoskeletal:      Right knee: She exhibits normal range of motion. Left knee: She exhibits normal range of motion. Skin:     General: Skin is warm. Findings: No rash. Neurological:      Mental Status: She is alert and oriented to person, place, and time. Psychiatric:         Behavior: Behavior normal.         DIAGNOSTIC RESULTS     Labs:No results found for this visit on 12/09/20.     IMAGING:    No orders to display         EKG:  none    URGENT CARE COURSE:     Vitals:    12/09/20 1123   BP: 134/70 Pulse: 81   Resp: 16   Temp: 98 °F (36.7 °C)   TempSrc: Infrared   SpO2: 98%       Medications - No data to display         PROCEDURES:  None    FINAL IMPRESSION      1. Abdominal pain, unspecified abdominal location          DISPOSITION/ PLAN     Physical exam is benign at this time and patient is given a note stating that she can return to work on the date that her work has deemed her able to return. She is advised to present to the ER return for any new or worsening symptoms and is agreeable plan as discussed. PATIENT REFERRED TO:  No primary care provider on file. No primary physician on file.       DISCHARGE MEDICATIONS:  New Prescriptions    No medications on file       Discontinued Medications    ONDANSETRON (ZOFRAN ODT) 4 MG DISINTEGRATING TABLET    Take 1 tablet by mouth every 8 hours as needed for Nausea    SETLAKIN 0.15-0.03 MG PER TABLET           Current Discharge Medication List          DARCIE Garcia CNP    (Please note that portions of this note were completed with a voice recognition program. Efforts were made to edit the dictations but occasionally words are mis-transcribed.)          DARCIE Garcia CNP  12/09/20 4627

## 2021-05-24 ENCOUNTER — APPOINTMENT (OUTPATIENT)
Dept: CT IMAGING | Age: 31
End: 2021-05-24
Payer: COMMERCIAL

## 2021-05-24 ENCOUNTER — APPOINTMENT (OUTPATIENT)
Dept: GENERAL RADIOLOGY | Age: 31
End: 2021-05-24
Payer: COMMERCIAL

## 2021-05-24 ENCOUNTER — HOSPITAL ENCOUNTER (EMERGENCY)
Age: 31
Discharge: HOME | End: 2021-05-24
Payer: COMMERCIAL

## 2021-05-24 VITALS
RESPIRATION RATE: 18 BRPM | OXYGEN SATURATION: 100 % | DIASTOLIC BLOOD PRESSURE: 71 MMHG | BODY MASS INDEX: 30.21 KG/M2 | TEMPERATURE: 98.9 F | HEART RATE: 69 BPM | SYSTOLIC BLOOD PRESSURE: 134 MMHG | WEIGHT: 190 LBS

## 2021-05-24 DIAGNOSIS — R07.89 CHEST WALL PAIN: Primary | ICD-10-CM

## 2021-05-24 LAB
ANION GAP SERPL CALCULATED.3IONS-SCNC: 11 MEQ/L (ref 8–16)
BASOPHILS # BLD: 0.5 %
BASOPHILS ABSOLUTE: 0 THOU/MM3 (ref 0–0.1)
BUN BLDV-MCNC: 6 MG/DL (ref 7–22)
CALCIUM SERPL-MCNC: 9 MG/DL (ref 8.5–10.5)
CHLORIDE BLD-SCNC: 106 MEQ/L (ref 98–111)
CO2: 22 MEQ/L (ref 23–33)
CREAT SERPL-MCNC: 0.7 MG/DL (ref 0.4–1.2)
D-DIMER QUANTITATIVE: 733 NG/ML FEU (ref 0–500)
EOSINOPHIL # BLD: 1.1 %
EOSINOPHILS ABSOLUTE: 0.1 THOU/MM3 (ref 0–0.4)
ERYTHROCYTE [DISTWIDTH] IN BLOOD BY AUTOMATED COUNT: 13.7 % (ref 11.5–14.5)
ERYTHROCYTE [DISTWIDTH] IN BLOOD BY AUTOMATED COUNT: 42.8 FL (ref 35–45)
GFR SERPL CREATININE-BSD FRML MDRD: > 90 ML/MIN/1.73M2
GLUCOSE BLD-MCNC: 88 MG/DL (ref 70–108)
HCT VFR BLD CALC: 34.9 % (ref 37–47)
HEMOGLOBIN: 11.4 GM/DL (ref 12–16)
IMMATURE GRANS (ABS): 0.01 THOU/MM3 (ref 0–0.07)
IMMATURE GRANULOCYTES: 0.2 %
LYMPHOCYTES # BLD: 37.1 %
LYMPHOCYTES ABSOLUTE: 2.1 THOU/MM3 (ref 1–4.8)
MCH RBC QN AUTO: 28.1 PG (ref 26–33)
MCHC RBC AUTO-ENTMCNC: 32.7 GM/DL (ref 32.2–35.5)
MCV RBC AUTO: 86.2 FL (ref 81–99)
MONOCYTES # BLD: 7 %
MONOCYTES ABSOLUTE: 0.4 THOU/MM3 (ref 0.4–1.3)
NUCLEATED RED BLOOD CELLS: 0 /100 WBC
OSMOLALITY CALCULATION: 274.6 MOSMOL/KG (ref 275–300)
PLATELET # BLD: 253 THOU/MM3 (ref 130–400)
PMV BLD AUTO: 9.9 FL (ref 9.4–12.4)
POTASSIUM REFLEX MAGNESIUM: 4 MEQ/L (ref 3.5–5.2)
PREGNANCY, SERUM: NEGATIVE
RBC # BLD: 4.05 MILL/MM3 (ref 4.2–5.4)
SEG NEUTROPHILS: 54.1 %
SEGMENTED NEUTROPHILS ABSOLUTE COUNT: 3 THOU/MM3 (ref 1.8–7.7)
SODIUM BLD-SCNC: 139 MEQ/L (ref 135–145)
WBC # BLD: 5.6 THOU/MM3 (ref 4.8–10.8)

## 2021-05-24 PROCEDURE — 6360000004 HC RX CONTRAST MEDICATION: Performed by: NURSE PRACTITIONER

## 2021-05-24 PROCEDURE — 71046 X-RAY EXAM CHEST 2 VIEWS: CPT

## 2021-05-24 PROCEDURE — 71275 CT ANGIOGRAPHY CHEST: CPT

## 2021-05-24 PROCEDURE — 85379 FIBRIN DEGRADATION QUANT: CPT

## 2021-05-24 PROCEDURE — 85025 COMPLETE CBC W/AUTO DIFF WBC: CPT

## 2021-05-24 PROCEDURE — 99283 EMERGENCY DEPT VISIT LOW MDM: CPT

## 2021-05-24 PROCEDURE — 6360000002 HC RX W HCPCS: Performed by: NURSE PRACTITIONER

## 2021-05-24 PROCEDURE — 36415 COLL VENOUS BLD VENIPUNCTURE: CPT

## 2021-05-24 PROCEDURE — 84703 CHORIONIC GONADOTROPIN ASSAY: CPT

## 2021-05-24 PROCEDURE — 80048 BASIC METABOLIC PNL TOTAL CA: CPT

## 2021-05-24 PROCEDURE — 96374 THER/PROPH/DIAG INJ IV PUSH: CPT

## 2021-05-24 RX ORDER — KETOROLAC TROMETHAMINE 30 MG/ML
30 INJECTION, SOLUTION INTRAMUSCULAR; INTRAVENOUS ONCE
Status: COMPLETED | OUTPATIENT
Start: 2021-05-24 | End: 2021-05-24

## 2021-05-24 RX ORDER — KETOROLAC TROMETHAMINE 10 MG/1
10 TABLET, FILM COATED ORAL EVERY 6 HOURS PRN
Qty: 20 TABLET | Refills: 0 | Status: SHIPPED | OUTPATIENT
Start: 2021-05-24 | End: 2022-03-11 | Stop reason: ALTCHOICE

## 2021-05-24 RX ADMIN — KETOROLAC TROMETHAMINE 30 MG: 30 INJECTION, SOLUTION INTRAMUSCULAR; INTRAVENOUS at 12:57

## 2021-05-24 RX ADMIN — IOPAMIDOL 80 ML: 755 INJECTION, SOLUTION INTRAVENOUS at 12:03

## 2021-05-24 ASSESSMENT — PAIN DESCRIPTION - FREQUENCY
FREQUENCY: INTERMITTENT
FREQUENCY: INTERMITTENT

## 2021-05-24 ASSESSMENT — ENCOUNTER SYMPTOMS
NAUSEA: 0
TROUBLE SWALLOWING: 0
ABDOMINAL PAIN: 0
BACK PAIN: 0
DIARRHEA: 0
SINUS PRESSURE: 0
SHORTNESS OF BREATH: 1
CHEST TIGHTNESS: 1
PHOTOPHOBIA: 0
RHINORRHEA: 0
COLOR CHANGE: 0
SINUS PAIN: 0
SORE THROAT: 0
WHEEZING: 0
VOMITING: 0
COUGH: 0
CONSTIPATION: 0

## 2021-05-24 ASSESSMENT — PAIN DESCRIPTION - LOCATION
LOCATION: BACK
LOCATION: BACK

## 2021-05-24 ASSESSMENT — PAIN SCALES - GENERAL
PAINLEVEL_OUTOF10: 5

## 2021-05-24 ASSESSMENT — PAIN DESCRIPTION - ONSET
ONSET: ON-GOING
ONSET: ON-GOING

## 2021-05-24 ASSESSMENT — PAIN DESCRIPTION - PROGRESSION
CLINICAL_PROGRESSION: NOT CHANGED
CLINICAL_PROGRESSION: NOT CHANGED

## 2021-05-24 ASSESSMENT — PAIN DESCRIPTION - DESCRIPTORS
DESCRIPTORS: ACHING
DESCRIPTORS: ACHING

## 2021-05-24 ASSESSMENT — PAIN DESCRIPTION - ORIENTATION: ORIENTATION: UPPER

## 2021-05-24 ASSESSMENT — PAIN DESCRIPTION - PAIN TYPE
TYPE: ACUTE PAIN
TYPE: ACUTE PAIN

## 2021-05-24 NOTE — LETTER
[facility] [address] [address] EMERGENCY DEPT  [address] [place] [address]  Phone: [phone]               May 24, 2021    Patient: Seth Marcelo   YOB: 1990   Date of Visit: 5/24/2021       To Whom It May Concern:    Alex Lundberg was seen and treated in our emergency department on 5/24/2021. She may return to work on 5/25/21.       Sincerely,       Heather Arzola RN        Signature:__________________________________

## 2021-05-24 NOTE — ED NOTES
Patient is resting in bed with easy and unlabored respirations. Call light in reach. Side rails up x2. Patient denies further complaints or concerns. Will monitor.         Alethea Santos, MARIANNE  05/24/21 5334

## 2021-05-24 NOTE — ED NOTES
Patient to the ED with complaints of back pain in her upper back for 1 week. Patient states she is . She states pain is worse with taking a deep breath. She denies any injuries. Patient denies any other complaints. Patient is resting in bed with easy and unlabored respirations. Call light in reach. Side rails up x2. Patient denies further complaints or concerns. Will monitor.         Sara Shirley RN  05/24/21 2915

## 2021-05-24 NOTE — ED PROVIDER NOTES
[address] [address] COMPLAINT       Chief Complaint   Patient presents with    Back Pain       Nurses Notes reviewed and I agree except as noted in the HPI. HISTORY OF PRESENT ILLNESS    Adam More is a 31 y.o. female who presents to the Emergency Department for the evaluation of left shoulder blade pain for the past 2 days, worsened with inspiration. Denies injury, denies previous occurrence of this. Pain is worse with range of motion, improves with rest.  Denies chance of pregnancy, denies using birth control. She denies fever chills, denies close exposure to COVID-19. The HPI was provided by the patient. REVIEW OF SYSTEMS     Review of Systems   Constitutional: Negative for chills, diaphoresis, fatigue and fever. HENT: Negative for congestion, ear pain, nosebleeds, rhinorrhea, sinus pressure, sinus pain, sore throat and trouble swallowing. Eyes: Negative for photophobia. Respiratory: Positive for chest tightness and shortness of breath. Negative for cough and wheezing. Cardiovascular: Negative for chest pain and palpitations. Gastrointestinal: Negative for abdominal pain, constipation, diarrhea, nausea and vomiting. Endocrine: Negative for cold intolerance and heat intolerance. Genitourinary: Negative for difficulty urinating, dysuria, flank pain, hematuria, pelvic pain, vaginal bleeding, vaginal discharge and vaginal pain. Musculoskeletal: Positive for arthralgias and myalgias (left shoulder and upper back, worse with inspiration). Negative for back pain, joint swelling and neck stiffness. Skin: Negative for color change and wound. Neurological: Negative for dizziness, weakness, light-headedness, numbness and headaches. Psychiatric/Behavioral: Negative for agitation, behavioral problems, confusion, hallucinations, self-injury and suicidal ideas. The patient is not nervous/anxious.         PAST MEDICAL HISTORY    has a past medical history of Hypertension and Sickle cell anemia ([facility] [address] SURGICAL HISTORY      has no past surgical history on file. CURRENT MEDICATIONS       Discharge Medication List as of 5/24/2021 12:35 PM      CONTINUE these medications which have NOT CHANGED    Details   Multiple Vitamins-Minerals (THERAPEUTIC MULTIVITAMIN-MINERALS) tablet Take 1 tablet by mouth dailyHistorical Med             ALLERGIES     has No Known Allergies. FAMILY HISTORY     She indicated that her mother is alive. She indicated that her father is alive. She indicated that her sister is alive. She indicated that her brother is alive. family history includes High Blood Pressure in her father. SOCIAL HISTORY      reports that she quit smoking about 8 years ago. Her smoking use included cigarettes. She has never used smokeless tobacco. She reports that she does not drink alcohol and does not use drugs. PHYSICAL EXAM     INITIAL VITALS:  weight is 190 lb (86.2 kg). Her temperature is 98.9 °F (37.2 °C). Her blood pressure is 134/71 and her pulse is 69. Her respiration is 18 and oxygen saturation is 100%. Physical Exam  Vitals and nursing note reviewed. Constitutional:       General: She is awake. She is not in acute distress. Appearance: Normal appearance. She is well-developed and normal weight. She is not ill-appearing, toxic-appearing or diaphoretic. HENT:      Head: Normocephalic and atraumatic. Right Ear: Tympanic membrane normal.      Left Ear: Tympanic membrane normal.      Nose: Nose normal.      Mouth/Throat:      Mouth: Mucous membranes are moist.      Pharynx: Oropharynx is clear. Eyes:      Extraocular Movements: Extraocular movements intact. Pupils: Pupils are equal, round, and reactive to light. Neck:      Vascular: No carotid bruit. Cardiovascular:      Rate and Rhythm: Normal rate and regular rhythm. Pulses: Normal pulses.       Heart sounds: Normal heart sounds, S1 either signs or Co-signs this chart in the absence of a cardiologist.    None    RADIOLOGY: non-plainfilm images(s) such as CT, Ultrasound and MRI are read by the radiologist.    CTA Chest W WO Contrast   Final Result       1. No evidence pulmonary emboli. 2. Tiny pleural-based nodule in the right upper lobe laterally, unchanged since previous study dated 13 August 2016.   3. Slight heterogeneity involving the right and left lobes of thyroid gland. .               **This report has been created using voice recognition software. It may contain minor errors which are inherent in voice recognition technology. **      Final report electronically signed by DR Denae Joshua on 5/24/2021 12:17 PM      XR CHEST (2 VW)   Final Result   Normal chest. No acute findings. **This report has been created using voice recognition software. It may contain minor errors which are inherent in voice recognition technology. **      Final report electronically signed by Dr. Amelia Sandoval on 5/24/2021 10:09 AM          LABS:     Labs Reviewed   CBC WITH AUTO DIFFERENTIAL - Abnormal; Notable for the following components:       Result Value    RBC 4.05 (*)     Hemoglobin 11.4 (*)     Hematocrit 34.9 (*)     All other components within normal limits   BASIC METABOLIC PANEL W/ REFLEX TO MG FOR LOW K - Abnormal; Notable for the following components:    CO2 22 (*)     BUN 6 (*)     All other components within normal limits   D-DIMER, QUANTITATIVE - Abnormal; Notable for the following components:    D-Dimer, Quant 733.00 (*)     All other components within normal limits   OSMOLALITY - Abnormal; Notable for the following components:    Osmolality Calc 274.6 (*)     All other components within normal limits   HCG, SERUM, QUALITATIVE   ANION GAP   GLOMERULAR FILTRATION RATE, ESTIMATED       EMERGENCY DEPARTMENT COURSE:   Vitals:    Vitals:    05/24/21 0926 05/24/21 1113   BP: 126/72 134/71   Pulse: 72 69   Resp: 19 18   Temp: 98.9 °F (37.2 °C)    SpO2: 100% 100%   Weight: 190 lb (86.2 kg)        9:26 AM EDT: The patient was seen and evaluated. MDM:  Patient was seen and evaluated for left shoulder pain, concerning that it is worse with deep inspiration. Pain not reproducible with palpation. On my initial exam she is in no acute distress, vitals were reviewed and were within acceptable limits. Basic labs were ordered, patient noted to have elevated D-dimer of 733. CT of the chest was completed showing no acute PE. Discussed with patient that this is likely musculoskeletal, encouraged to return to the emergency department for new or worsening symptoms. Patient discharged in stable condition. CRITICAL CARE:   None    CONSULTS:  None    PROCEDURES:  None    FINAL IMPRESSION      1. Chest wall pain          DISPOSITION/PLAN   Discharge    PATIENT REFERRED TO:  [facility] EMERGENCY DEPT  [address] [place] [address]  [phone]  Go to   If symptoms worsen      DISCHARGE MEDICATIONS:  Discharge Medication List as of 5/24/2021 12:35 PM      START taking these medications    Details   ketorolac (TORADOL) 10 MG tablet Take 1 tablet by mouth every 6 hours as needed for Pain, Disp-20 tablet, R-0Print             (Please note that portions of this note were completed with a voice recognition program.  Efforts were made to edit the dictations but occasionally words are mis-transcribed.)    The patient was given an opportunity to see the Emergency Attending. The patient voiced understanding that I was a Mid-LevelProvider and was in agreement with being seen independently by myself. Provider:  I personally performed the services described in the documentation, reviewed and edited the documentation which was dictated to the scribe in my presence, and it accurately records my words and actions.     DARCIE Rueda Do, CNP, 5/24/21, 2:08 PM       DARCIE Rueda Do, CNP  05/26/21 1404

## 2021-05-26 ENCOUNTER — HOSPITAL ENCOUNTER (EMERGENCY)
Age: 31
Discharge: HOME | End: 2021-05-26
Payer: COMMERCIAL

## 2021-05-26 VITALS
SYSTOLIC BLOOD PRESSURE: 147 MMHG | OXYGEN SATURATION: 100 % | DIASTOLIC BLOOD PRESSURE: 87 MMHG | HEART RATE: 78 BPM | RESPIRATION RATE: 18 BRPM | TEMPERATURE: 98.5 F

## 2021-05-26 DIAGNOSIS — S29.019A STRAIN OF MUSCLE AT THORAX LEVEL: ICD-10-CM

## 2021-05-26 DIAGNOSIS — G44.209 TENSION HEADACHE: Primary | ICD-10-CM

## 2021-05-26 PROCEDURE — 99283 EMERGENCY DEPT VISIT LOW MDM: CPT

## 2021-05-26 PROCEDURE — 6370000000 HC RX 637 (ALT 250 FOR IP): Performed by: NURSE PRACTITIONER

## 2021-05-26 RX ORDER — LIDOCAINE 4 G/G
1 PATCH TOPICAL DAILY
Qty: 30 PATCH | Refills: 0 | Status: SHIPPED | OUTPATIENT
Start: 2021-05-26 | End: 2021-06-25

## 2021-05-26 RX ORDER — BUTALBITAL, ACETAMINOPHEN AND CAFFEINE 50; 325; 40 MG/1; MG/1; MG/1
2 TABLET ORAL ONCE
Status: COMPLETED | OUTPATIENT
Start: 2021-05-26 | End: 2021-05-26

## 2021-05-26 RX ORDER — BUTALBITAL, ACETAMINOPHEN AND CAFFEINE 300; 40; 50 MG/1; MG/1; MG/1
1 CAPSULE ORAL EVERY 6 HOURS PRN
Qty: 15 CAPSULE | Refills: 0 | Status: SHIPPED | OUTPATIENT
Start: 2021-05-26 | End: 2022-03-11 | Stop reason: ALTCHOICE

## 2021-05-26 RX ORDER — LIDOCAINE 4 G/G
1 PATCH TOPICAL ONCE
Status: DISCONTINUED | OUTPATIENT
Start: 2021-05-26 | End: 2021-05-26 | Stop reason: HOSPADM

## 2021-05-26 RX ADMIN — BUTALBITAL, ACETAMINOPHEN, AND CAFFEINE 2 TABLET: 50; 325; 40 TABLET ORAL at 15:01

## 2021-05-26 ASSESSMENT — ENCOUNTER SYMPTOMS
SHORTNESS OF BREATH: 0
COLOR CHANGE: 0
SINUS PRESSURE: 0
DIARRHEA: 0
WHEEZING: 0
NAUSEA: 0
CHEST TIGHTNESS: 0
RHINORRHEA: 0
SORE THROAT: 0
BACK PAIN: 0
SINUS PAIN: 0
COUGH: 0
VOMITING: 0
ABDOMINAL PAIN: 0

## 2021-05-26 ASSESSMENT — PAIN DESCRIPTION - ORIENTATION: ORIENTATION: LEFT;MID

## 2021-05-26 NOTE — ED PROVIDER NOTES
[facility] Emergency Department    CHIEF COMPLAINT       Chief Complaint   Patient presents with    Back Pain    Headache       Nurses Notes reviewed and I agree except as noted in the HPI. HISTORY OF PRESENT ILLNESS    Jennifer Temple is a 31 y.o. female who presents to the ED for evaluation of headache and back pain. Patient states she was here 2 days ago, she had lab work in CT scan of the chest obtained. Results were reassuring, she was diagnosed with musculoskeletal pain. She was started on Toradol she notes no improvement with this. She notes headache to the front of her head going around her eyes, she denies any change in her vision, she notes in the beginning of her illness she had some nausea but never vomited. She denies any neck pain. Denies any numbness or tingling or weakness of any of her extremities. She denies any true anterior chest pain she has some change in her breathing with physical activity but denies shortness of breath. She denies fevers or chills denies diarrhea. She denies any significant past medical history. HPI was provided by the patient. REVIEW OF SYSTEMS     Review of Systems   Constitutional: Negative for activity change, chills, fatigue and fever. HENT: Negative for congestion, rhinorrhea, sinus pressure, sinus pain and sore throat. Respiratory: Negative for cough, chest tightness, shortness of breath and wheezing. Cardiovascular: Negative for chest pain, palpitations and leg swelling. Gastrointestinal: Negative for abdominal pain, diarrhea, nausea and vomiting. Genitourinary: Negative for decreased urine volume, difficulty urinating, dysuria, flank pain and frequency. Musculoskeletal: Positive for myalgias. Negative for arthralgias, back pain, neck pain and neck stiffness. Skin: Negative for color change and rash. Allergic/Immunologic: Negative for immunocompromised state. Neurological: Positive for headaches.  Negative for cervical adenopathy. Skin:     General: Skin is warm and dry. Capillary Refill: Capillary refill takes less than 2 seconds. Neurological:      Mental Status: She is alert and oriented to person, place, and time. Cranial Nerves: No cranial nerve deficit. Sensory: No sensory deficit. Motor: No weakness. Coordination: Coordination normal.      Gait: Gait normal.   Psychiatric:         Mood and Affect: Mood normal.         Speech: Speech normal.         Behavior: Behavior normal.         Thought Content: Thought content normal.         DIFFERENTIAL DIAGNOSIS:   Rhomboid strain, latissimus dorsi strain, intercostal strain, contusion, tension headache, low suspicion of pseudotumor cerebri, normal pressure hydrocephalus. DIAGNOSTIC RESULTS     RADIOLOGY: non-plainfilm images(s) such as CT, Ultrasound and MRI are read by the radiologist.  Plain radiographic images are visualized and preliminarily interpreted by the emergency physician unless otherwise stated below. No orders to display         LABS:   Labs Reviewed - No data to display    EMERGENCY DEPARTMENT COURSE:   Vitals:    Vitals:    05/26/21 1339   BP: (!) 147/87   Pulse: 78   Resp: 18   Temp: 98.5 °F (36.9 °C)   TempSrc: Oral   SpO2: 100%     MDM    Patient was seen and evaluated in the emergency department, patient appeared to be in no acute distress, vital signs are reviewed, slight hypertension noted, patient has a history of this in the past.  Physical exam was completed, cranial nerves are grossly intact, no coordination deficits noted, Romberg negative, able to ambulate with no difficulty. She has some tenderness to the left thoracic paraspinal muscles, this extends along the ribs in the same area. CT scan from 2 days ago was reviewed and negative. Lab work was reassuring. Discussed my findings my plan of care with the patient she is amenable with discharge. She is treated with Fioricet and Lidoderm patch.   She requests us to fill out disability paperwork, I declined, provide the patient with a work note, will set up follow-up appoint with family medicine for this patient. She verbalized understanding. Medications   butalbital-acetaminophen-caffeine (FIORICET, ESGIC) per tablet 2 tablet (2 tablets Oral Given 5/26/21 2781)       Patient was seenindependently by myself. The patient's final impression and disposition and plan was determined by myself. CRITICAL CARE:   None    CONSULTS:  None    PROCEDURES:  None    FINAL IMPRESSION     1. Tension headache    2. Strain of muscle at thorax level          DISPOSITION/PLAN   Patient discharged in stable condition  PATIENT REFERREDTO:  [facility] [address],[address] [facility]  Go to   820am tomorrow      DISCHARGE MEDICATIONS:  Discharge Medication List as of 5/26/2021  3:08 PM          (Please note that portions of this note were completed with a voice recognition program.  Efforts were made to edit the dictations but occasionally words are mis-transcribed.)      Provider:  I personally performed the services described in the documentation,reviewed and edited the documentation which was dictated to the scribe in my presence, and it accurately records my words and actions.     Dick Gregorio CNP 05/26/21 9:12 PM    Duke Gregorio, APRN - CNP        [employer], APRLAUREN - CNP  05/26/21 6658

## 2021-05-26 NOTE — LETTER
[facility] [address] [address] EMERGENCY DEPT  [address] [place] [address]  Phone: [phone]               May 26, 2021    Patient: Yudy Canas   YOB: 1990   Date of Visit: 5/26/2021       To Whom It May Concern:    Tashia Koenig was seen and treated in our emergency department on 5/26/2021. She may return to work on 5/28/2021.       Sincerely,       Davon Gregorio, APRN - CNP         Signature:__________________________________

## 2021-05-26 NOTE — ED NOTES
Pt comes in through ED lobby. She was seen on 5/24/21 for back pain and headache. She was discharged home with toradol for pain. This has not helped. The pain in her back has gotten to the point where she can not lay on her left side.  She feels pressure on her forehead and around her sinuses     Asmita Giles RN  05/26/21 5554

## 2021-05-27 ENCOUNTER — NURSE ONLY (OUTPATIENT)
Dept: LAB | Age: 31
End: 2021-05-27

## 2021-05-27 ENCOUNTER — OFFICE VISIT (OUTPATIENT)
Dept: FAMILY MEDICINE CLINIC | Age: 31
End: 2021-05-27
Payer: COMMERCIAL

## 2021-05-27 VITALS
BODY MASS INDEX: 30.86 KG/M2 | SYSTOLIC BLOOD PRESSURE: 122 MMHG | RESPIRATION RATE: 16 BRPM | DIASTOLIC BLOOD PRESSURE: 60 MMHG | OXYGEN SATURATION: 98 % | TEMPERATURE: 98.6 F | WEIGHT: 192 LBS | HEART RATE: 86 BPM | HEIGHT: 66 IN

## 2021-05-27 DIAGNOSIS — D64.9 NORMOCYTIC ANEMIA: ICD-10-CM

## 2021-05-27 DIAGNOSIS — R53.83 FATIGUE, UNSPECIFIED TYPE: ICD-10-CM

## 2021-05-27 DIAGNOSIS — D64.9 NORMOCYTIC ANEMIA: Primary | ICD-10-CM

## 2021-05-27 DIAGNOSIS — G44.229 CHRONIC TENSION-TYPE HEADACHE, NOT INTRACTABLE: ICD-10-CM

## 2021-05-27 DIAGNOSIS — R91.1 RIGHT UPPER LOBE PULMONARY NODULE: ICD-10-CM

## 2021-05-27 LAB
FERRITIN: 24 NG/ML (ref 10–291)
FOLATE: 11.8 NG/ML (ref 4.8–24.2)
HEMOGLOBIN: 12.3 GM/DL (ref 12–16)
IRON SATURATION: 24 % (ref 20–50)
IRON: 90 UG/DL (ref 50–170)
T4 FREE: 1.16 NG/DL (ref 0.93–1.76)
TOTAL IRON BINDING CAPACITY: 373 UG/DL (ref 171–450)
TSH SERPL DL<=0.05 MIU/L-ACNC: 1.06 UIU/ML (ref 0.4–4.2)
VITAMIN B-12: 696 PG/ML (ref 211–911)

## 2021-05-27 PROCEDURE — 99204 OFFICE O/P NEW MOD 45 MIN: CPT | Performed by: STUDENT IN AN ORGANIZED HEALTH CARE EDUCATION/TRAINING PROGRAM

## 2021-05-27 ASSESSMENT — ENCOUNTER SYMPTOMS
ABDOMINAL PAIN: 0
CONSTIPATION: 0
COUGH: 0
EYE PAIN: 0
TROUBLE SWALLOWING: 0
SHORTNESS OF BREATH: 0
DIARRHEA: 0
BLOOD IN STOOL: 0

## 2021-05-27 ASSESSMENT — PATIENT HEALTH QUESTIONNAIRE - PHQ9
SUM OF ALL RESPONSES TO PHQ QUESTIONS 1-9: 2
SUM OF ALL RESPONSES TO PHQ9 QUESTIONS 1 & 2: 2
SUM OF ALL RESPONSES TO PHQ QUESTIONS 1-9: 2
2. FEELING DOWN, DEPRESSED OR HOPELESS: 1

## 2021-05-27 NOTE — PROGRESS NOTES
Health Maintenance Due   Topic Date Due    Hepatitis C screen  Never done    Varicella vaccine (1 of 2 - 2-dose childhood series) Never done    Hepatitis B vaccine (3 of 3 - 3-dose primary series) 01/14/1998    COVID-19 Vaccine (1) Never done    HIV screen  Never done    Cervical cancer screen  Never done     Pt aware.

## 2021-05-27 NOTE — PROGRESS NOTES
S: 31 y.o. female with   Chief Complaint   Patient presents with    ED Follow-up     paperwork [address] Patient     establish       HPI: please see resident note for HPI and ROS. BP Readings from Last 3 Encounters:   05/27/21 122/60   05/26/21 (!) 147/87   05/24/21 134/71     Wt Readings from Last 3 Encounters:   05/27/21 192 lb (87.1 kg)   05/24/21 190 lb (86.2 kg)   02/06/20 190 lb (86.2 kg)       O: VS:  height is 5' 6\" (1.676 m) and weight is 192 lb (87.1 kg). Her oral temperature is 98.6 °F (37 °C). Her blood pressure is 122/60 and her pulse is 86. Her respiration is 16 and oxygen saturation is 98%. AAO/NAD, appropriate affect for mood       Diagnosis Orders   1. Normocytic anemia  TSH    T4, Free    Iron    Iron Saturation    Ferritin    Iron Binding Capacity    Vitamin B12 & Folate    Hemoglobin   2. Right upper lobe pulmonary nodule     3. Fatigue, unspecified type  TSH    T4, Free    Iron    Iron Saturation    Ferritin    Iron Binding Capacity    Vitamin B12 & Folate   4. Chronic tension-type headache, not intractable         Plan:  Check tsh and iron studies. Increase water intake. Continue Fioricet prn. rtc 1 month for pap and wellness. Health Maintenance Due   Topic Date Due    Varicella vaccine (1 of 2 - 2-dose childhood series) Never done    Hepatitis B vaccine (3 of 3 - 3-dose primary series) 01/14/1998    COVID-19 Vaccine (1) Never done    Cervical cancer screen  Never done       Attending Physician Statement  I have discussed the case, including pertinent history and exam findings with the resident. I also have seen the patient and performed key portions of the examination. I agree with the documented assessment and plan as documented by the resident.         Oswaldo Mendes DO 5/27/2021 9:01 AM

## 2021-05-27 NOTE — PROGRESS NOTES
Kathia Concepcion is a 31 y.o. female who presents today for:  Chief Complaint   Patient presents with    ED Follow-up     paperwork [address] Patient     establish     HPI:   Kathia Concepcion is 31 y.o. who presents today for establishing care. Headaches - x1 week, used to have when younger. Associated with dizziness sometimes. No visual changes. Drinking 2-4 cups of water daily. Frontal headaches. Hx of sickle cell trait and has been told she has iron deficiency in the past.     Hx of gestational HTN in 2015. BP wnl today. Not on meds.      Hx of normal Paps with OBGYN    Pulmonary nodule stable in appearance from 2015    Objective:     Vitals:    05/27/21 0830   BP: 122/60   Site: Left Upper Arm   Position: Sitting   Cuff Size: Large Adult   Pulse: 86   Resp: 16   Temp: 98.6 °F (37 °C)   TempSrc: Oral   SpO2: 98%   Weight: 192 lb (87.1 kg)   Height: 5' 6\" (1.676 m)       Wt Readings from Last 3 Encounters:   05/27/21 192 lb (87.1 kg)   05/24/21 190 lb (86.2 kg)   02/06/20 190 lb (86.2 kg)       BP Readings from Last 3 Encounters:   05/27/21 122/60   05/26/21 (!) 147/87   05/24/21 134/71       Lab Results   Component Value Date    WBC 5.6 05/24/2021    HGB 11.4 (L) 05/24/2021    HCT 34.9 (L) 05/24/2021    MCV 86.2 05/24/2021     05/24/2021     Lab Results   Component Value Date     05/24/2021    K 4.0 05/24/2021     05/24/2021    CO2 22 (L) 05/24/2021    BUN 6 (L) 05/24/2021    CREATININE 0.7 05/24/2021    GLUCOSE 88 05/24/2021    CALCIUM 9.0 05/24/2021    PROT 7.8 03/13/2017    LABALBU 4.2 03/13/2017    BILITOT 0.3 03/13/2017    ALKPHOS 55 03/13/2017    AST 15 03/13/2017    ALT 11 03/13/2017    LABGLOM >90 05/24/2021     No results found for: TSHFT4, TSH  No results found for: LABA1C  No results found for: EAG  No results found for: CHOL  No results found for: TRIG  No results found for: HDL  No results found for: LDLCHOLESTEROL, LDLCALC    No results found for: LABMICR, [id number]    Review of Systems   Constitutional: Positive for fatigue. Negative for chills and fever. HENT: Negative for congestion, ear pain, postnasal drip and trouble swallowing. Eyes: Negative for pain and visual disturbance. Respiratory: Negative for cough and shortness of breath. Cardiovascular: Negative for chest pain and palpitations. Gastrointestinal: Negative for abdominal pain, blood in stool, constipation and diarrhea. Genitourinary: Negative for dysuria and hematuria. Skin: Negative for rash and wound. Neurological: Positive for dizziness (associated with headaches) and headaches. Psychiatric/Behavioral: The patient is not nervous/anxious. Physical Exam  Vitals and nursing note reviewed. Constitutional:       General: She is not in acute distress. Appearance: She is well-developed. She is not diaphoretic. HENT:      Head: Normocephalic and atraumatic. Right Ear: External ear normal.      Left Ear: External ear normal.      Nose: Nose normal.   Eyes:      General: No scleral icterus. Right eye: No discharge. Left eye: No discharge. Conjunctiva/sclera: Conjunctivae normal.   Cardiovascular:      Rate and Rhythm: Normal rate and regular rhythm. Heart sounds: Normal heart sounds. No murmur heard. Pulmonary:      Effort: Pulmonary effort is normal.      Breath sounds: Normal breath sounds. Abdominal:      Palpations: Abdomen is soft. Tenderness: There is no abdominal tenderness. Musculoskeletal:      Cervical back: Normal range of motion. Skin:     General: Skin is warm and dry. Findings: No erythema or rash. Neurological:      Mental Status: She is alert and oriented to person, place, and time. Psychiatric:         Behavior: Behavior normal.         Thought Content:  Thought content normal.         Judgment: Judgment normal.            Food Insecurity: No Food Insecurity    Worried About Running Out of Food in the Last Year: Never true    Ran Out of Food in the Last Year: Never true       Assessment / Plan:      Diagnosis Orders   1. Normocytic anemia  TSH    T4, Free    Iron    Iron Saturation    Ferritin    Iron Binding Capacity    Vitamin B12 & Folate    Hemoglobin   2. Right upper lobe pulmonary nodule     3. Fatigue, unspecified type  TSH    T4, Free    Iron    Iron Saturation    Ferritin    Iron Binding Capacity    Vitamin B12 & Folate   4. Chronic tension-type headache, not intractable       Increase water intake to >64 oz daily   Check iron studies now, TSH/T4 now  Check Hgb in 3 months   F/u if problems sooner otherwise see back in 3 months for wellness with Pap   Will monitor pulmonary nodule periodically   FMLA filled out and faxed back to [facility], resume work on 5/28/2021. Return in about 3 months (around 8/27/2021) for Wellness visit 3 mnths with Pap . Medications Prescribed:  No orders of the defined types were placed in this encounter. Future Appointments   Date Time Provider Gerardo Perez   8/27/2021  9:20 AM Amrita Delarosa DO [facility] [address]       Patient given educational materials - see patient instructions. Discussed use, benefit, and sideeffects of prescribed medications. All patient questions answered. Pt voiced understanding. Reviewed health maintenance. Instructed to continue current medications, diet and exercise. Patient agreed with treatment plan. Follow up as directed.      Electronically signed by Alberto Roland DO on 5/27/2021 at 9:56 AM

## 2021-05-28 ENCOUNTER — TELEPHONE (OUTPATIENT)
Dept: FAMILY MEDICINE CLINIC | Age: 31
End: 2021-05-28

## 2021-09-08 PROBLEM — D64.9 NORMOCYTIC ANEMIA: Status: RESOLVED | Noted: 2021-05-27 | Resolved: 2021-09-08

## 2021-09-09 ENCOUNTER — OFFICE VISIT (OUTPATIENT)
Dept: FAMILY MEDICINE CLINIC | Age: 31
End: 2021-09-09
Payer: COMMERCIAL

## 2021-09-09 VITALS
RESPIRATION RATE: 16 BRPM | OXYGEN SATURATION: 98 % | DIASTOLIC BLOOD PRESSURE: 86 MMHG | BODY MASS INDEX: 35.47 KG/M2 | HEART RATE: 75 BPM | TEMPERATURE: 97.1 F | WEIGHT: 226 LBS | SYSTOLIC BLOOD PRESSURE: 138 MMHG | HEIGHT: 67 IN

## 2021-09-09 DIAGNOSIS — Z00.00 ENCOUNTER FOR WELLNESS EXAMINATION IN ADULT: Primary | ICD-10-CM

## 2021-09-09 DIAGNOSIS — Z12.4 CERVICAL CANCER SCREENING: ICD-10-CM

## 2021-09-09 DIAGNOSIS — G44.219 EPISODIC TENSION-TYPE HEADACHE, NOT INTRACTABLE: ICD-10-CM

## 2021-09-09 DIAGNOSIS — Z23 NEED FOR HEPATITIS B BOOSTER VACCINATION: ICD-10-CM

## 2021-09-09 DIAGNOSIS — R93.89 THYROID WITH HETEROGENEOUS ECHOTEXTURE DETERMINED BY ULTRASOUND: ICD-10-CM

## 2021-09-09 DIAGNOSIS — F40.10 SOCIAL ANXIETY DISORDER: ICD-10-CM

## 2021-09-09 PROCEDURE — 90471 IMMUNIZATION ADMIN: CPT | Performed by: STUDENT IN AN ORGANIZED HEALTH CARE EDUCATION/TRAINING PROGRAM

## 2021-09-09 PROCEDURE — 90746 HEPB VACCINE 3 DOSE ADULT IM: CPT | Performed by: STUDENT IN AN ORGANIZED HEALTH CARE EDUCATION/TRAINING PROGRAM

## 2021-09-09 PROCEDURE — 99395 PREV VISIT EST AGE 18-39: CPT | Performed by: STUDENT IN AN ORGANIZED HEALTH CARE EDUCATION/TRAINING PROGRAM

## 2021-09-09 ASSESSMENT — ENCOUNTER SYMPTOMS
BLOOD IN STOOL: 0
ABDOMINAL PAIN: 0
DIARRHEA: 0
SHORTNESS OF BREATH: 0
TROUBLE SWALLOWING: 0
EYE PAIN: 0
COUGH: 0
CONSTIPATION: 0

## 2021-09-09 NOTE — PATIENT INSTRUCTIONS
Thank you   1. Thank you for trusting us with your healthcare needs. You may receive a survey regarding today's visit. It would help us out if you would take a few moments to provide your feedback. We value your input. 2. Please bring in ALL medications BOTTLES, including inhalers, herbal supplements, over the counter, prescribed & non-prescribed medicine. The office would like actual medication bottles and a list.   3. Please note our OFFICE POLICIES:   a. Prior to getting your labs drawn, please check with your insurance company for benefits and eligibility of lab services. Often, insurance companies cover certain tests for preventative visits only. It is patient's responsibility to see what is covered. b. We are unable to change a diagnosis after the test has been performed. c. Lab orders will not be re-printed. Please hold onto your original lab orders and take them to your lab to be completed. d. If you no show your scheduled appointment three times, you will be dismissed from this practice. e. Benji Miyamoto must be completed 24 hours prior to your schedule appointment. 4. If the list below has been completed, PLEASE FAX RECORDS TO OUR OFFICE @ [phone].  Once the records have been received we will update your records at our office:  Health Maintenance Due   Topic Date Due    Varicella vaccine (1 of 2 - 2-dose childhood series) Never done    Hepatitis B vaccine (3 of 3 - 3-dose primary series) 01/14/1998    COVID-19 Vaccine (1) Never done    Pap smear  Never done    Flu vaccine (1) Never done

## 2021-09-09 NOTE — PROGRESS NOTES
S: 36 y.o. female with   Chief Complaint   Patient presents with    Gynecologic Exam     Wellness and Pap       HPI: please see resident note for HPI and ROS. BP Readings from Last 3 Encounters:   09/09/21 138/86   05/27/21 122/60   05/26/21 (!) 147/87     Wt Readings from Last 3 Encounters:   09/09/21 226 lb (102.5 kg)   05/27/21 192 lb (87.1 kg)   05/24/21 190 lb (86.2 kg)       O: VS:  height is 5' 6.5\" (1.689 m) and weight is 226 lb (102.5 kg). Her temporal temperature is 97.1 °F (36.2 °C). Her blood pressure is 138/86 and her pulse is 75. Her respiration is 16 and oxygen saturation is 98%. Diagnosis Orders   1. Encounter for wellness examination in adult  PAP SMEAR   2. Social anxiety disorder  [facility], [address] [place] [address], Psychology, [facility] NAKITA REYNA II.VIERTEL   3. Thyroid with heterogeneous echotexture determined by ultrasound  US THYROID   4. Episodic tension-type headache, not intractable     5. Cervical cancer screening  PAP SMEAR   6. Need for hepatitis B booster vaccination  Hep B Vaccine Adult (ENGERIX-B)       Plan:  Hep b vaccine ordered. Counseling for social anxiety. U/s thyroid for abnormal ct thyroid. Monitor diet and weight. F/u 6 mos. Health Maintenance Due   Topic Date Due    Hepatitis B vaccine (3 of 3 - 3-dose primary series) 01/14/1998    COVID-19 Vaccine (1) Never done    Pap smear  Never done    Flu vaccine (1) Never done       Attending Physician Statement  I have discussed the case, including pertinent history and exam findings with the resident. I agree with the documented assessment and plan as documented by the resident.         Renan White DO 9/9/2021 10:04 AM

## 2021-09-09 NOTE — PROGRESS NOTES
Diana Mckenzie is a 36 y.o. female who presents today for:  Chief Complaint   Patient presents with    Gynecologic Exam     Wellness and Pap     HPI:   Diana Mckenzie is 36 y.o. who presents today for follow-up on headaches, normocytic anemia, and is here for Pap. She had labs completed which demonstrated normalization of her hemoglobin at 12.3. Iron studies, B12, and folate were all within normal limits. HA intermittent and well controlled. Sinus pressure. Taking pseudofed for sinus congestion. LMP 8/24/2021  Periods 8 days, cycles 30+ days, becoming heavier on day 3. Sexually active with one partner. No concerns. Used plan B in July. Does not desire contraception at this time. AE to OCP and depo previously, scared of IUDs. 30 lb weight gain since May. Anxious with social activities since a child. Never sought care for this previously. This has caused her to cancel plans.     Objective:     Vitals:    09/09/21 0909   BP: 138/86   Site: Right Upper Arm   Position: Sitting   Cuff Size: Medium Adult   Pulse: 75   Resp: 16   Temp: 97.1 °F (36.2 °C)   TempSrc: Temporal   SpO2: 98%   Weight: 226 lb (102.5 kg)   Height: 5' 6.5\" (1.689 m)       Wt Readings from Last 3 Encounters:   09/09/21 226 lb (102.5 kg)   05/27/21 192 lb (87.1 kg)   05/24/21 190 lb (86.2 kg)       BP Readings from Last 3 Encounters:   09/09/21 138/86   05/27/21 122/60   05/26/21 (!) 147/87       Lab Results   Component Value Date    WBC 5.6 05/24/2021    HGB 12.3 05/27/2021    HCT 34.9 (L) 05/24/2021    MCV 86.2 05/24/2021     05/24/2021     Lab Results   Component Value Date     05/24/2021    K 4.0 05/24/2021     05/24/2021    CO2 22 (L) 05/24/2021    BUN 6 (L) 05/24/2021    CREATININE 0.7 05/24/2021    GLUCOSE 88 05/24/2021    CALCIUM 9.0 05/24/2021    PROT 7.8 03/13/2017    LABALBU 4.2 03/13/2017    BILITOT 0.3 03/13/2017    ALKPHOS 55 03/13/2017    AST 15 03/13/2017    ALT 11 03/13/2017    LABGLOM and dry. Findings: No erythema or rash. Neurological:      Mental Status: She is alert and oriented to person, place, and time. Psychiatric:         Behavior: Behavior normal.         Thought Content: Thought content normal.         Judgment: Judgment normal.         Immunization History   Administered Date(s) Administered    DTP 1992, 01/20/1993, 07/15/1993, 03/23/1994    DTaP vaccine 08/28/1997    Hepatitis B Adult (Engerix-B) 09/09/2021    Hepatitis B Ped/Adol (Engerix-B, Recombivax HB) 08/28/1997, 11/19/1997    Hib vaccine 1992, 01/20/1993, 07/15/1993, 03/23/1994    MMR 03/23/1994, 08/28/1997, 04/14/2004    Polio OPV 1992, 01/20/1993, 03/23/1994, 08/28/1997    Tdap (Boostrix, Adacel) 07/16/2015       Health Maintenance Due   Topic Date Due    COVID-19 Vaccine (1) Never done    Pap smear  Never done    Flu vaccine (1) Never done          Food Insecurity: No Food Insecurity    Worried About Running Out of Food in the Last Year: Never true    Catalina of Food in the Last Year: Never true       Assessment / Plan:      Diagnosis Orders   1. Encounter for wellness examination in adult  PAP SMEAR   2. Social anxiety disorder  [facility], [address] [place], Psychology, [facility]   3. Thyroid with heterogeneous echotexture determined by ultrasound  US THYROID   4. Episodic tension-type headache, not intractable     5. Cervical cancer screening  PAP SMEAR   6. Need for hepatitis B booster vaccination  Hep B Vaccine Adult (ENGERIX-B)     US thyroid due to previous heterogeneity on CT  Continue current medications  Counseling for social anxiety  Follow up in 6 months for weight  Encouraged diet and lifestyle modification, educated about 500 calorie deficit per day and encouraged monitoring of calorie intake  Pap collected today  Hep B vaccine today   Avoid pseudophed as this may be worsening headaches. Consider Flonase daily or antihistamine.    Pulmonary nodule <6 mm should not be followed up on according to [place] for management of solid nodules were published in 2005. Return in about 6 months (around 3/9/2022) for weight check/ general check . Medications Prescribed:  No orders of the defined types were placed in this encounter. Future Appointments   Date Time Provider Gerardo Perez   3/11/2022  9:20 AM Curt Rizzo DO SRPX  [facility]       Patient given educational materials - see patient instructions. Discussed use, benefit, and sideeffects of prescribed medications. All patient questions answered. Pt voiced understanding. Reviewed health maintenance. Instructed to continue current medications, diet and exercise. Patient agreed with treatment plan. Follow up as directed.      Electronically signed by Stacia Live DO on 9/9/2021 at 11:15 AM

## 2021-09-09 NOTE — PROGRESS NOTES
Health Maintenance Due   Topic Date Due    Varicella vaccine (1 of 2 - 2-dose childhood series) Never done    Hepatitis B vaccine (3 of 3 - 3-dose primary series) 01/14/1998    COVID-19 Vaccine (1) Never done Declined    Pap smear  Never done Today    Flu vaccine (1) Never done

## 2021-09-18 LAB — CYTOLOGY THIN PREP PAP: NORMAL

## 2021-10-01 ENCOUNTER — HOSPITAL ENCOUNTER (OUTPATIENT)
Dept: ULTRASOUND IMAGING | Age: 31
Discharge: HOME | End: 2021-10-01
Payer: COMMERCIAL

## 2021-10-01 DIAGNOSIS — R93.89 THYROID WITH HETEROGENEOUS ECHOTEXTURE DETERMINED BY ULTRASOUND: ICD-10-CM

## 2021-10-01 PROCEDURE — 76536 US EXAM OF HEAD AND NECK: CPT

## 2022-02-21 ENCOUNTER — OFFICE VISIT (OUTPATIENT)
Dept: PSYCHOLOGY | Age: 32
End: 2022-02-21
Payer: COMMERCIAL

## 2022-02-21 DIAGNOSIS — F40.10 SOCIAL ANXIETY DISORDER: Primary | ICD-10-CM

## 2022-02-21 PROCEDURE — 90791 PSYCH DIAGNOSTIC EVALUATION: CPT | Performed by: PSYCHOLOGIST

## 2022-02-21 ASSESSMENT — PATIENT HEALTH QUESTIONNAIRE - PHQ9
9. THOUGHTS THAT YOU WOULD BE BETTER OFF DEAD, OR OF HURTING YOURSELF: 0
3. TROUBLE FALLING OR STAYING ASLEEP: 3
SUM OF ALL RESPONSES TO PHQ QUESTIONS 1-9: 2
2. FEELING DOWN, DEPRESSED OR HOPELESS: 0
SUM OF ALL RESPONSES TO PHQ QUESTIONS 1-9: 2
4. FEELING TIRED OR HAVING LITTLE ENERGY: 2
SUM OF ALL RESPONSES TO PHQ QUESTIONS 1-9: 2
SUM OF ALL RESPONSES TO PHQ9 QUESTIONS 1 & 2: 2
SUM OF ALL RESPONSES TO PHQ9 QUESTIONS 1 & 2: 2
10. IF YOU CHECKED OFF ANY PROBLEMS, HOW DIFFICULT HAVE THESE PROBLEMS MADE IT FOR YOU TO DO YOUR WORK, TAKE CARE OF THINGS AT HOME, OR GET ALONG WITH OTHER PEOPLE: 1
SUM OF ALL RESPONSES TO PHQ QUESTIONS 1-9: 11
SUM OF ALL RESPONSES TO PHQ QUESTIONS 1-9: 11
1. LITTLE INTEREST OR PLEASURE IN DOING THINGS: 2
2. FEELING DOWN, DEPRESSED OR HOPELESS: 0
8. MOVING OR SPEAKING SO SLOWLY THAT OTHER PEOPLE COULD HAVE NOTICED. OR THE OPPOSITE, BEING SO FIGETY OR RESTLESS THAT YOU HAVE BEEN MOVING AROUND A LOT MORE THAN USUAL: 1
SUM OF ALL RESPONSES TO PHQ QUESTIONS 1-9: 11
SUM OF ALL RESPONSES TO PHQ QUESTIONS 1-9: 11
6. FEELING BAD ABOUT YOURSELF - OR THAT YOU ARE A FAILURE OR HAVE LET YOURSELF OR YOUR FAMILY DOWN: 0
5. POOR APPETITE OR OVEREATING: 1
7. TROUBLE CONCENTRATING ON THINGS, SUCH AS READING THE NEWSPAPER OR WATCHING TELEVISION: 2
SUM OF ALL RESPONSES TO PHQ QUESTIONS 1-9: 2
1. LITTLE INTEREST OR PLEASURE IN DOING THINGS: 2

## 2022-02-21 NOTE — PATIENT INSTRUCTIONS
assuming the worst will happen. You just know that things will go horribly, so youre already anxious before youre even in the situation. Catastrophizing  Blowing things out of proportion. For example, if people notice that youre nervous, it will be awful, terrible, or disastrous.   Personalizing  Assuming that people are focusing on you in a negative way or that whats going on with other people has to do with you. Tip 2: Focus on others, not yourself  When were in a social situation that makes us nervous, many of us tend to get caught up in our anxious thoughts and feelings. You may be convinced that everyone is looking at you and judging you. Your focus is on your bodily sensations, hoping that by paying extra close attention you can better control them. But this excessive self-focus just makes you more aware of how nervous youre feeling, triggering even more anxiety! It also prevents you from fully concentrating on the conversations around you or the performance youre giving. Switching from an internal to an external focus can go a long way toward reducing social anxiety. This is easier said than done, but you cant pay attention to two things at once. The more you concentrate on whats happening around you, the less youll be affected by anxiety. Focus your attention on other people, but not on what theyre thinking of you! Instead, do your best to engage them and make a genuine connection. Remember that anxiety isnt as visible as you think. And even if someone notices that youre nervous, that doesnt mean theyll think badly of you. Chances are other people are feeling just as nervous as youor have done in the past.    Really listen to what is being said not to your own negative thoughts. Focus on the present moment, rather than worrying about what youre going to say or beating yourself up for a flub thats already passed. Release the pressure to be perfect.  Instead, focus on being genuine and attentivequalities that other people will appreciate. Tip 3: Learn to control your breathing  Many changes happen in your body when you become anxious. One of the first changes is that you begin to breathe quickly. Overbreathing (hyperventilation) throws off the balance of oxygen and carbon dioxide in your bodyleading to more physical symptoms of anxiety, such as dizziness, a feeling of suffocation, increased heart rate, and muscle tension. Learning to slow your breathing down can help bring your physical symptoms of anxiety back under control. Practicing the following breathing exercise will help you stay calm:    Sit comfortably with your back straight and your shoulders relaxed. Put one hand on your chest and the other on your stomach. Inhale slowly and deeply through your nose for 4 seconds. The hand on your stomach should rise, while the hand on your chest should move very little. Hold the breath for 2 seconds. Exhale slowly through your mouth for 6 seconds, pushing out at much air as you can. The hand on your stomach should move in as you exhale, but your other hand should move very little. Continue to breathe in through your nose and out through your mouth. Focus on keeping a slow and steady breathing pattern of 4-in, 2-hold, and 6-out. Tip 4: Face your fears  One of the most helpful things you can do to overcome social anxiety is to face the social situations you fear rather than avoid them. Avoidance keeps social anxiety disorder going. While avoiding nerve-wracking situations may help you feel better in the short term, it prevents you from becoming more comfortable in social situations and learning how to cope in the long term. In fact, the more you avoid a feared social situation, the more frightening it becomes. Avoidance can also prevent you from doing things youd like to do or reaching certain goals.  For example, a fear of speaking up may prevent you from sharing your ideas at work, standing out in the classroom, or making new friends. While it may seem impossible to overcome a feared social situation, you can do it by taking it one small step at a time. The key is to start with a situation that you can handle and gradually work your way up to more challenging situations, building your confidence and coping skills as you move up the anxiety ladder.     For example, if socializing with strangers makes you anxious, you might start by accompanying an outgoing friend to a party. Once youre comfortable with that step, you might try introducing yourself to one new person, and so on. To work your way up a social anxiety ladder:    Dont try to face your biggest fear right away. Its never a good idea to move too fast, take on too much, or force things. This may backfire and reinforce your anxiety. Be patient. Overcoming social anxiety takes time and practice. Its a gradual step-by-step progress. Use the skills youve learned to stay calm, such as focusing on your breathing and challenging negative assumptions. Socially interacting with co-workers: A sample anxiety ladder  Step 1: Say hello to your co-workers. Step 2: Ask a co-worker a work-related question. Step 3: Ask a co-worker what they did over the weekend. Step 4: Sit in the break room with co-workers during your coffee break. Step 5: Eat lunch in the break room with your co-workers. Step 6: Eat lunch in the break room and make small talk with one or more of your coworkers, such as talking about the weather, sports, or current events. Step 7: Ask a co-worker to go for a coffee or drink after work. Step 8: Go out for lunch with a group of co-workers. Step 9: Share personal information about yourself with one or more co-workers. Step 10: Attend a staff party with your co-workers.               Tip 5: Make an effort to be more social  Actively seeking out supportive social environments is another effective way of challenging your fears and overcoming social anxiety. The following suggestions are good ways to start interacting with others in positive ways: Take a social skills class or an assertiveness training class. These classes are often offered at local adult education centers or community colleges. Volunteer doing something you enjoy, such as walking dogs in a shelter, or stuffing envelopes for a campaignanything that will give you an activity to focus on while you are also engaging with a small number of like-minded people. Work on your communication skills. Good relationships depend on clear, emotionally-intelligent communication. If you find that you have trouble connecting to others, learning the basic skills of emotional intelligence can help. Tips for making friends even if youre shy or socially awkward  No matter how awkward or nervous you feel in the company of others, you can learn to silence self-critical thoughts, boost your self-esteem, and become more confident and secure in your interactions with others. You dont have to change your personality. By simply learning new skills and adopting a different outlook you can overcome your fears and anxiety and build rewarding friendships. Tip 6: Adopt an anti-anxiety lifestyle  The mind and the body are intrinsically linkedand more and more evidence suggests that how you treat your body can have a significant effect on your anxiety levels, your ability to manage anxiety symptoms, and your overall self-confidence. While lifestyle changes alone arent enough to overcome social phobia or social anxiety disorder, they can support your overall treatment progress. The following lifestyle tips will help you reduce your overall anxiety levels and set the stage for successful treatment.     Avoid or limit caffeine  Coffee, tea, soda, and energy drinks act as stimulants that increase anxiety symptoms. Consider cutting out caffeine entirely, or keeping your intake low and limited to the morning. Get active  Make physical activity a priority30 minutes per day if possible. If you hate to exercise, try pairing it with something you do enjoy, such as window shopping while walking laps around the mall or dancing to your favorite music. Add more omega-3 fats to your diet  Omega-3 fatty acids support brain health and can improve your mood, outlook, and ability to handle anxiety. The best sources are fatty fish (salmon, herring, mackerel, anchovies, sardines), seaweed, flaxseed, and walnuts. Drink only in moderation  You may be tempted to drink before a social situation to calm your nerves, but alcohol increases your risk of having an anxiety attack. Quit smoking  Nicotine is a powerful stimulant. Contrary to popular belief, smoking leads to higher, not lower, levels of anxiety. If you need help kicking the habit, see: How to Quit Smoking. Get enough quality sleep  When youre sleep deprived, youre more vulnerable to anxiety. Being well rested will help you stay calm in social situations.
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
## 2022-03-10 NOTE — PROGRESS NOTES
Nallely Reese is a 36 y.o. female who presents today for:  Chief Complaint   Patient presents with    6 Month Follow-Up     HPI:   Nallely Reese is 36 y.o. who presents today for 6 month follow up. Had f/u with pulmonology regarding pulmonary nodule and was reassured. Has had bloating, epigastric pain. Worse with eating. No specific foods trigger symptoms. No radiation of pain. Has been present for several months but has worsened over the last month. Has increased her intake of smoking black and milds. No other changes in diet.      Objective:     Vitals:    03/11/22 0923   BP: 104/62   Site: Right Upper Arm   Position: Sitting   Cuff Size: Medium Adult   Pulse: 50   Resp: 20   Temp: 97.3 °F (36.3 °C)   TempSrc: Skin   SpO2: 97%   Weight: 230 lb 12.8 oz (104.7 kg)   Height: 5' 6.5\" (1.689 m)       Wt Readings from Last 3 Encounters:   03/11/22 230 lb 12.8 oz (104.7 kg)   09/09/21 226 lb (102.5 kg)   05/27/21 192 lb (87.1 kg)       BP Readings from Last 3 Encounters:   03/11/22 104/62   09/09/21 138/86   05/27/21 122/60       Lab Results   Component Value Date    WBC 5.6 05/24/2021    HGB 12.3 05/27/2021    HCT 34.9 (L) 05/24/2021    MCV 86.2 05/24/2021     05/24/2021     Lab Results   Component Value Date     05/24/2021    K 4.0 05/24/2021     05/24/2021    CO2 22 (L) 05/24/2021    BUN 6 (L) 05/24/2021    CREATININE 0.7 05/24/2021    GLUCOSE 88 05/24/2021    CALCIUM 9.0 05/24/2021    PROT 7.8 03/13/2017    LABALBU 4.2 03/13/2017    BILITOT 0.3 03/13/2017    ALKPHOS 55 03/13/2017    AST 15 03/13/2017    ALT 11 03/13/2017    LABGLOM >90 05/24/2021     Lab Results   Component Value Date    TSH 1.060 05/27/2021    T4FREE 1.16 05/27/2021     No results found for: LABA1C  No results found for: EAG  No results found for: CHOL  No results found for: TRIG  No results found for: HDL  No results found for: LDLCHOLESTEROL, LDLCALC    No results found for: LABMICR, [id number]    Review of Systems   Gastrointestinal: Positive for abdominal distention, abdominal pain, constipation (alternating with diarrhea) and diarrhea (alternating with constipation). Negative for nausea and vomiting. Physical Exam  Vitals and nursing note reviewed. Constitutional:       General: She is not in acute distress. Appearance: She is well-developed. She is not diaphoretic. HENT:      Head: Normocephalic and atraumatic. Right Ear: External ear normal.      Left Ear: External ear normal.      Nose: Nose normal.   Eyes:      General: No scleral icterus. Right eye: No discharge. Left eye: No discharge. Conjunctiva/sclera: Conjunctivae normal.   Cardiovascular:      Rate and Rhythm: Normal rate and regular rhythm. Heart sounds: Normal heart sounds. No murmur heard. Pulmonary:      Effort: Pulmonary effort is normal.      Breath sounds: Normal breath sounds. Abdominal:      General: There is no distension. Palpations: Abdomen is soft. Tenderness: There is abdominal tenderness (epigastric, RUQ). There is no guarding. Comments: + McBurney test   Musculoskeletal:      Cervical back: Normal range of motion. Skin:     General: Skin is warm and dry. Findings: No erythema or rash. Neurological:      Mental Status: She is alert and oriented to person, place, and time. Psychiatric:         Behavior: Behavior normal.         Thought Content:  Thought content normal.         Judgment: Judgment normal.         Immunization History   Administered Date(s) Administered    DTP 1992, 01/20/1993, 07/15/1993, 03/23/1994    DTaP vaccine 08/28/1997    Hepatitis B Adult (Engerix-B) 09/09/2021    Hepatitis B Ped/Adol (Engerix-B, Recombivax HB) 08/28/1997, 11/19/1997    Hib vaccine 1992, 01/20/1993, 07/15/1993, 03/23/1994    MMR 03/23/1994, 08/28/1997, 04/14/2004    Polio OPV 1992, 01/20/1993, 03/23/1994, 08/28/1997    Tdap (Boostrix, Adacel) 07/16/2015       Health Maintenance Due   Topic Date Due    COVID-19 Vaccine (1) Never done    Pneumococcal 0-64 years Vaccine (1 of 2 - PPSV23) Never done    Flu vaccine (1) Never done          Food Insecurity: No Food Insecurity    Worried About Running Out of Food in the Last Year: Never true    Ran Out of Food in the Last Year: Never true       Assessment / Plan:      Diagnosis Orders   1. Dyspepsia  famotidine (PEPCID) 20 MG tablet   2. Colicky right upper quadrant pain  US GALLBLADDER RUQ     Start pepcid BID PRN   Encouraged decreased smoking  Check RUQ US given + McBurney test       Return in about 4 weeks (around 4/8/2022), or if symptoms worsen or fail to improve. Otherwise, f/u in 6 months for physical.           Medications Prescribed:  Orders Placed This Encounter   Medications    famotidine (PEPCID) 20 MG tablet     Sig: Take 1 tablet by mouth 2 times daily as needed (bloating, abdominal pain)     Dispense:  180 tablet     Refill:  1       Future Appointments   Date Time Provider Gerardo Perez   3/25/2022  9:30 AM DERICK Espinosa [facility] [address] [facility]       Patient given educational materials - see patient instructions. Discussed use, benefit, and sideeffects of prescribed medications. All patient questions answered. Pt voiced understanding. Reviewed health maintenance. Instructed to continue current medications, diet and exercise. Patient agreed with treatment plan. Follow up as directed.      Electronically signed by Marcelle Greco DO on 3/11/2022 at 10:57 AM

## 2022-03-11 ENCOUNTER — OFFICE VISIT (OUTPATIENT)
Dept: FAMILY MEDICINE CLINIC | Age: 32
End: 2022-03-11
Payer: COMMERCIAL

## 2022-03-11 VITALS
SYSTOLIC BLOOD PRESSURE: 104 MMHG | DIASTOLIC BLOOD PRESSURE: 62 MMHG | OXYGEN SATURATION: 97 % | BODY MASS INDEX: 36.22 KG/M2 | WEIGHT: 230.8 LBS | HEART RATE: 50 BPM | RESPIRATION RATE: 20 BRPM | HEIGHT: 67 IN | TEMPERATURE: 97.3 F

## 2022-03-11 DIAGNOSIS — R10.13 DYSPEPSIA: Primary | ICD-10-CM

## 2022-03-11 DIAGNOSIS — R10.11 COLICKY RIGHT UPPER QUADRANT PAIN: ICD-10-CM

## 2022-03-11 PROBLEM — R91.1 RIGHT UPPER LOBE PULMONARY NODULE: Status: RESOLVED | Noted: 2021-05-27 | Resolved: 2022-03-11

## 2022-03-11 PROBLEM — G44.229 CHRONIC TENSION-TYPE HEADACHE, NOT INTRACTABLE: Status: RESOLVED | Noted: 2021-05-27 | Resolved: 2022-03-11

## 2022-03-11 PROCEDURE — 99213 OFFICE O/P EST LOW 20 MIN: CPT | Performed by: STUDENT IN AN ORGANIZED HEALTH CARE EDUCATION/TRAINING PROGRAM

## 2022-03-11 RX ORDER — FAMOTIDINE 20 MG/1
20 TABLET, FILM COATED ORAL 2 TIMES DAILY PRN
Qty: 180 TABLET | Refills: 1 | Status: SHIPPED | OUTPATIENT
Start: 2022-03-11

## 2022-03-11 ASSESSMENT — ENCOUNTER SYMPTOMS
VOMITING: 0
ABDOMINAL PAIN: 1
CONSTIPATION: 1
DIARRHEA: 1
NAUSEA: 0
ABDOMINAL DISTENTION: 1

## 2022-03-11 NOTE — PROGRESS NOTES
Attending Physician Statement  I have discussed the case, including pertinent history and exam findings with the resident. I agree with the documented assessment and plan as documented by the resident.   GE modifier added to this encounter      Gregorio Barrera MD 3/11/2022 12:36 PM

## 2022-03-11 NOTE — PROGRESS NOTES
Health Maintenance Due   Topic Date Due    COVID-19 Vaccine (1) Never done    Pneumococcal 0-64 years Vaccine (1 of 2 - PPSV23) Never done    Flu vaccine (1) Never done

## 2022-03-11 NOTE — PATIENT INSTRUCTIONS
Patient Education        Indigestion (Dyspepsia or Heartburn): Care Instructions  Your Care Instructions  Sometimes it can be hard to pinpoint the cause of indigestion. (It is also called dyspepsia or heartburn.) Most cases of an upset stomach with bloating, burning, burping, and nausea are minor and go away within several hours. Home treatment and over-the-counter medicine often are able to control symptoms. But if you take medicine to relieve your indigestion without making diet and lifestyle changes, your symptoms are likely to return again and again. If you get indigestion often, it may be a sign of a more serious medical problem. Be sure to follow up with your doctor, who may want to do tests to be sure of the cause of your indigestion. Follow-up care is a key part of your treatment and safety. Be sure to make and go to all appointments, and call your doctor if you are having problems. It's also a good idea to know your test results and keep a list of the medicines you take. How can you care for yourself at home? · Your doctor may recommend over-the-counter medicine. For mild or occasional indigestion, antacids such as Gaviscon, Mylanta, Maalox, or Tums, may help. Be safe with medicines. Be careful when you take over-the-counter antacid medicines. Many of these medicines have aspirin in them. Read the label to make sure that you are not taking more than the recommended dose. Too much aspirin can be harmful. · Your doctor also may recommend over-the-counter acid reducers, such as Pepcid AC (famotidine), Tagamet HB (cimetidine), or Prilosec (omeprazole). Read and follow all instructions on the label. If you use these medicines often, talk with your doctor. · Change your eating habits. ? It's best to eat several small meals instead of two or three large meals. ? After you eat, wait 2 to 3 hours before you lie down. ? Avoid foods that make your symptoms worse.  These may include chocolate, mint, alcohol, pepper, spicy foods, high-fat foods, or drinks with caffeine in them, such as tea, coffee, anika, or energy drinks. If your symptoms are worse after you eat a certain food, you may want to stop eating it to see if your symptoms get better. · Do not smoke or chew tobacco. Smoking can make GERD worse. If you need help quitting, talk to your doctor about stop-smoking programs and medicines. These can increase your chances of quitting for good. · If you have GERD symptoms at night, raise the head of your bed 6 to 8 inches. You can do this by putting the frame on blocks or placing a foam wedge under the head of your mattress. (Adding extra pillows does not work.)  · Do not wear tight clothing around your middle. · Lose weight if you need to. Losing just 5 to 10 pounds can help. · Do not take anti-inflammatory medicines, such as aspirin, ibuprofen (Advil, Motrin), or naproxen (Aleve). These can irritate the stomach. If you need a pain medicine, try acetaminophen (Tylenol), which does not cause stomach upset. When should you call for help? Call your doctor now or seek immediate medical care if:    · You have new or worse belly pain.     · You are vomiting. Watch closely for changes in your health, and be sure to contact your doctor if:    · You have new or worse symptoms of indigestion.     · You have trouble or pain swallowing.     · You are losing weight.     · You do not get better as expected. Where can you learn more? Go to [email or web address] and sign in to your 8bit account. Enter A196 in the [facility] box to learn more about \"Indigestion (Dyspepsia or Heartburn): Care Instructions. \"     If you do not have an account, please click on the \"Sign Up Now\" link. Current as of: September 8, 2021               Content Version: 13.1  © 0676-4178 [employer]wise, Incorporated. Care instructions adapted under license by [facility] ([facility]).  If you have questions about a medical condition or this instruction, always ask your healthcare professional. [place] [address] any warranty or liability for your use of this information.

## 2023-02-17 ENCOUNTER — HOSPITAL ENCOUNTER (EMERGENCY)
Age: 33
Discharge: HOME | End: 2023-02-17
Payer: COMMERCIAL

## 2023-02-17 VITALS
SYSTOLIC BLOOD PRESSURE: 131 MMHG | RESPIRATION RATE: 16 BRPM | WEIGHT: 227 LBS | OXYGEN SATURATION: 100 % | HEART RATE: 68 BPM | DIASTOLIC BLOOD PRESSURE: 74 MMHG | TEMPERATURE: 97.7 F | BODY MASS INDEX: 36.09 KG/M2

## 2023-02-17 DIAGNOSIS — H66.93 BILATERAL OTITIS MEDIA, UNSPECIFIED OTITIS MEDIA TYPE: Primary | ICD-10-CM

## 2023-02-17 PROCEDURE — 99213 OFFICE O/P EST LOW 20 MIN: CPT

## 2023-02-17 PROCEDURE — 99213 OFFICE O/P EST LOW 20 MIN: CPT | Performed by: NURSE PRACTITIONER

## 2023-02-17 RX ORDER — CEFDINIR 300 MG/1
300 CAPSULE ORAL 2 TIMES DAILY
Qty: 20 CAPSULE | Refills: 0 | Status: SHIPPED | OUTPATIENT
Start: 2023-02-17 | End: 2023-02-27

## 2023-02-17 RX ORDER — PREDNISONE 20 MG/1
40 TABLET ORAL DAILY
Qty: 14 TABLET | Refills: 0 | Status: SHIPPED | OUTPATIENT
Start: 2023-02-17 | End: 2023-02-24

## 2023-02-17 ASSESSMENT — ENCOUNTER SYMPTOMS
DIARRHEA: 0
NAUSEA: 0
VOMITING: 0
SHORTNESS OF BREATH: 0
SORE THROAT: 0
COUGH: 1

## 2023-02-17 ASSESSMENT — PAIN DESCRIPTION - DESCRIPTORS: DESCRIPTORS: ACHING

## 2023-02-17 ASSESSMENT — PAIN DESCRIPTION - FREQUENCY: FREQUENCY: CONTINUOUS

## 2023-02-17 ASSESSMENT — PAIN - FUNCTIONAL ASSESSMENT
PAIN_FUNCTIONAL_ASSESSMENT: 0-10
PAIN_FUNCTIONAL_ASSESSMENT: PREVENTS OR INTERFERES SOME ACTIVE ACTIVITIES AND ADLS

## 2023-02-17 ASSESSMENT — PAIN DESCRIPTION - ORIENTATION: ORIENTATION: RIGHT;LEFT

## 2023-02-17 ASSESSMENT — PAIN DESCRIPTION - PAIN TYPE: TYPE: ACUTE PAIN

## 2023-02-17 ASSESSMENT — PAIN DESCRIPTION - LOCATION: LOCATION: EAR

## 2023-02-17 ASSESSMENT — PAIN SCALES - GENERAL: PAINLEVEL_OUTOF10: 6

## 2023-02-17 NOTE — ED PROVIDER NOTES
[facility]  Urgent Care Encounter       CHIEF COMPLAINT       Chief Complaint   Patient presents with    Otalgia     Bilateral       Nurses Notes reviewed and I agree except as noted in the HPI. HISTORY OF PRESENT ILLNESS   Serenity Kilgore is a 29 y.o. female who presents for evaluation of bilateral ear pain. Patient is that the pain began in the left ear 4 days ago with pain in the right ear beginning yesterday. States that she has had upper respiratory/cold symptoms for the past week. She states that she did take some NyQuil at home denies any other medications or interventions. She denies any fevers. The history is provided by the patient. REVIEW OF SYSTEMS     Review of Systems   Constitutional:  Negative for chills and fever. HENT:  Positive for congestion and ear pain. Negative for ear discharge and sore throat. Respiratory:  Positive for cough. Negative for shortness of breath. Cardiovascular:  Negative for chest pain. Gastrointestinal:  Negative for diarrhea, nausea and vomiting. Musculoskeletal:  Negative for arthralgias and myalgias. Skin:  Negative for rash. Allergic/Immunologic: Negative for immunocompromised state. Neurological:  Negative for headaches. PAST MEDICAL HISTORY         Diagnosis Date    Normocytic anemia 5/27/2021    Right upper lobe pulmonary nodule 2.2 mm pleural nodule 5/27/2021    Sickle cell anemia (HCC)     Has trait       SURGICALHISTORY     Patient  has no past surgical history on file. CURRENT MEDICATIONS       Previous Medications    FAMOTIDINE (PEPCID) 20 MG TABLET    Take 1 tablet by mouth 2 times daily as needed (bloating, abdominal pain)       ALLERGIES     Patient is has No Known Allergies.     Patients   Immunization History   Administered Date(s) Administered    DTP 1992, 01/20/1993, 07/15/1993, 03/23/1994    DTaP vaccine 08/28/1997    Hepatitis B Adult (Engerix-B) 09/09/2021    Hepatitis B Ped/Adol (Engerix-B, Recombivax HB) 08/28/1997, 11/19/1997    Hib vaccine 1992, 01/20/1993, 07/15/1993, 03/23/1994    MMR 03/23/1994, 08/28/1997, 04/14/2004    Polio OPV 1992, 01/20/1993, 03/23/1994, 08/28/1997    Tdap (Boostrix, Adacel) 07/16/2015       FAMILY HISTORY     Patient's family history includes High Blood Pressure in her father. SOCIAL HISTORY     Patient  reports that she quit smoking about 10 years ago. Her smoking use included cigarettes. She has never used smokeless tobacco. She reports current alcohol use. She reports that she does not use drugs. PHYSICAL EXAM     ED TRIAGE VITALS  BP: 131/74, Temp: 97.7 °F (36.5 °C), Heart Rate: 68, Resp: 16, SpO2: 100 %,Estimated body mass index is 36.09 kg/m² as calculated from the following:    Height as of 3/11/22: 5' 6.5\" (1.689 m). Weight as of this encounter: 227 lb (103 kg). ,Patient's last menstrual period was 02/05/2023. Physical Exam  Vitals and nursing note reviewed. Constitutional:       General: She is not in acute distress. Appearance: She is well-developed. She is not diaphoretic. HENT:      Right Ear: Tympanic membrane is erythematous and bulging. Left Ear: Tympanic membrane is erythematous and bulging. Mouth/Throat:      Mouth: Mucous membranes are moist.      Pharynx: Oropharynx is clear. Eyes:      Conjunctiva/sclera:      Right eye: Right conjunctiva is not injected. Left eye: Left conjunctiva is not injected. Pupils: Pupils are equal.   Cardiovascular:      Rate and Rhythm: Normal rate and regular rhythm. Heart sounds: No murmur heard. Pulmonary:      Effort: Pulmonary effort is normal. No respiratory distress. Breath sounds: Normal breath sounds. Musculoskeletal:      Cervical back: Normal range of motion. Skin:     General: Skin is warm. Findings: No rash. Neurological:      Mental Status: She is alert and oriented to person, place, and time.    Psychiatric:         Behavior: Behavior normal.       DIAGNOSTIC RESULTS     Labs:No results found for this visit on 02/17/23.    IMAGING:    No orders to display         EKG:      URGENT CARE COURSE:     Vitals:    02/17/23 1714 02/17/23 1718   BP:  131/74   Pulse:  68   Resp: 16 16   Temp:  97.7 °F (36.5 °C)   TempSrc:  Temporal   SpO2:  100%   Weight: 227 lb (103 kg)        Medications - No data to display         PROCEDURES:  None    FINAL IMPRESSION      1. Bilateral otitis media, unspecified otitis media type          DISPOSITION/ PLAN     Exam is consistent with bilateral otitis media at this time.  I discussed with the patient we will plan to treat with oral antibiotics and prednisone and she is advised to rest and hydrate and follow-up on an outpatient basis if symptoms not improve or worsen.  She is agreeable to the plan as discussed.      PATIENT REFERRED TO:  Cr Jones, DO  [address] [place] [address] / [facility] [address]      DISCHARGE MEDICATIONS:  New Prescriptions    CEFDINIR (OMNICEF) 300 MG CAPSULE    Take 1 capsule by mouth 2 times daily for 10 days    PREDNISONE (DELTASONE) 20 MG TABLET    Take 2 tablets by mouth daily for 7 days       Discontinued Medications    No medications on file       Current Discharge Medication List          DARCIE Velasquez CNP    (Please note that portions of this note were completed with a voice recognition program. Efforts were made to edit the dictations but occasionally words are mis-transcribed.)           DARCIE Velasquez CNP  02/17/23 1515

## 2023-02-17 NOTE — ED TRIAGE NOTES
Pt to room 10 with c/o bilateral ear pain starting approx 4 days after having cold symptoms last week.

## 2023-04-03 ENCOUNTER — HOSPITAL ENCOUNTER (EMERGENCY)
Age: 33
Discharge: HOME | End: 2023-04-03
Attending: EMERGENCY MEDICINE
Payer: COMMERCIAL

## 2023-04-03 VITALS
DIASTOLIC BLOOD PRESSURE: 90 MMHG | RESPIRATION RATE: 20 BRPM | BODY MASS INDEX: 31.39 KG/M2 | HEIGHT: 67 IN | OXYGEN SATURATION: 100 % | HEART RATE: 76 BPM | WEIGHT: 200 LBS | TEMPERATURE: 98.1 F | SYSTOLIC BLOOD PRESSURE: 150 MMHG

## 2023-04-03 DIAGNOSIS — N39.0 URINARY TRACT INFECTION WITH HEMATURIA, SITE UNSPECIFIED: Primary | ICD-10-CM

## 2023-04-03 DIAGNOSIS — R31.9 URINARY TRACT INFECTION WITH HEMATURIA, SITE UNSPECIFIED: Primary | ICD-10-CM

## 2023-04-03 LAB
BACTERIA URNS QL MICRO: ABNORMAL /HPF
BILIRUB UR QL STRIP.AUTO: NEGATIVE
CASTS #/AREA URNS LPF: ABNORMAL /LPF
CASTS 2: ABNORMAL /LPF
CHARACTER UR: ABNORMAL
COLOR: YELLOW
CRYSTALS URNS MICRO: ABNORMAL
EPITHELIAL CELLS, UA: ABNORMAL /HPF
GLUCOSE UR QL STRIP.AUTO: NEGATIVE MG/DL
HGB UR QL STRIP.AUTO: ABNORMAL
KETONES UR QL STRIP.AUTO: NEGATIVE
MISCELLANEOUS 2: ABNORMAL
NITRITE UR QL STRIP: NEGATIVE
PH UR STRIP.AUTO: 6 [PH] (ref 5–9)
PROT UR STRIP.AUTO-MCNC: 100 MG/DL
RBC URINE: > 200 /HPF
RENAL EPI CELLS #/AREA URNS HPF: ABNORMAL /[HPF]
SP GR UR REFRACT.AUTO: 1.01 (ref 1–1.03)
UROBILINOGEN, URINE: 0.2 EU/DL (ref 0–1)
WBC #/AREA URNS HPF: > 200 /HPF
WBC #/AREA URNS HPF: ABNORMAL /[HPF]
YEAST LIKE FUNGI URNS QL MICRO: ABNORMAL

## 2023-04-03 PROCEDURE — 81001 URINALYSIS AUTO W/SCOPE: CPT

## 2023-04-03 PROCEDURE — 87086 URINE CULTURE/COLONY COUNT: CPT

## 2023-04-03 PROCEDURE — 99283 EMERGENCY DEPT VISIT LOW MDM: CPT

## 2023-04-03 RX ORDER — CEPHALEXIN 500 MG/1
500 CAPSULE ORAL 4 TIMES DAILY
Qty: 28 CAPSULE | Refills: 0 | Status: SHIPPED | OUTPATIENT
Start: 2023-04-03 | End: 2023-04-10

## 2023-04-03 RX ORDER — PHENAZOPYRIDINE HYDROCHLORIDE 100 MG/1
100 TABLET, FILM COATED ORAL 3 TIMES DAILY PRN
Qty: 9 TABLET | Refills: 0 | Status: SHIPPED | OUTPATIENT
Start: 2023-04-03 | End: 2023-04-06

## 2023-04-03 ASSESSMENT — PAIN - FUNCTIONAL ASSESSMENT: PAIN_FUNCTIONAL_ASSESSMENT: 0-10

## 2023-04-03 ASSESSMENT — PAIN DESCRIPTION - ORIENTATION: ORIENTATION: LOWER

## 2023-04-03 ASSESSMENT — PAIN DESCRIPTION - LOCATION: LOCATION: ABDOMEN

## 2023-04-03 ASSESSMENT — PAIN SCALES - GENERAL: PAINLEVEL_OUTOF10: 5

## 2023-04-03 NOTE — ED TRIAGE NOTES
Pt present to ED from home. States she thinks she has a UTI. Pt reports pain and blood upon urination, starting last night. Pt feels like she has to constantly urinate. She has had UTIs in the past and it feels the same. Pt reports pain as a 5 in the lower abdomen. Urine sample collected.

## 2023-04-03 NOTE — DISCHARGE INSTRUCTIONS
Take the full course of antibiotics  You can use Tylenol as needed for pain  You can use Pyridium for pain  Make sure you drink plenty fluids to stay well-hydrated  Follow-up with your family doctor within a week.

## 2023-04-03 NOTE — ED PROVIDER NOTES
detected in proofreading. Please refer to my supervising physician's documentation if my documentation differs.     Electronically Signed: Dino Yarbrough MD, 04/03/23, 8:38 AM          Dino Yarbrough MD  Resident  04/03/23 8805

## 2023-04-04 LAB
BACTERIA UR CULT: ABNORMAL
ORGANISM: ABNORMAL

## 2023-12-02 ENCOUNTER — HOSPITAL ENCOUNTER (EMERGENCY)
Age: 33
Discharge: HOME | End: 2023-12-02
Payer: COMMERCIAL

## 2023-12-02 VITALS
DIASTOLIC BLOOD PRESSURE: 107 MMHG | SYSTOLIC BLOOD PRESSURE: 139 MMHG | TEMPERATURE: 99 F | OXYGEN SATURATION: 100 % | HEART RATE: 69 BPM | RESPIRATION RATE: 16 BRPM

## 2023-12-02 DIAGNOSIS — H65.01 NON-RECURRENT ACUTE SEROUS OTITIS MEDIA OF RIGHT EAR: Primary | ICD-10-CM

## 2023-12-02 PROCEDURE — 99213 OFFICE O/P EST LOW 20 MIN: CPT

## 2023-12-02 ASSESSMENT — ENCOUNTER SYMPTOMS
SORE THROAT: 0
ALLERGIC/IMMUNOLOGIC NEGATIVE: 1
COUGH: 0
DIARRHEA: 0
TROUBLE SWALLOWING: 0
EYE REDNESS: 0
VOMITING: 0
RHINORRHEA: 0
NAUSEA: 0
EYE PAIN: 0
ABDOMINAL PAIN: 0
WHEEZING: 0
SHORTNESS OF BREATH: 0
CONSTIPATION: 0
BACK PAIN: 0
EYE DISCHARGE: 0

## 2023-12-02 ASSESSMENT — PAIN DESCRIPTION - LOCATION: LOCATION: EAR

## 2023-12-02 ASSESSMENT — PAIN DESCRIPTION - PAIN TYPE: TYPE: ACUTE PAIN

## 2023-12-02 ASSESSMENT — PAIN DESCRIPTION - FREQUENCY: FREQUENCY: CONTINUOUS

## 2023-12-02 ASSESSMENT — PAIN DESCRIPTION - DESCRIPTORS: DESCRIPTORS: ACHING;SHARP

## 2023-12-02 ASSESSMENT — PAIN - FUNCTIONAL ASSESSMENT: PAIN_FUNCTIONAL_ASSESSMENT: 0-10

## 2024-09-07 ENCOUNTER — HOSPITAL ENCOUNTER (EMERGENCY)
Age: 34
Discharge: HOME | End: 2024-09-07

## 2024-09-07 VITALS
RESPIRATION RATE: 16 BRPM | DIASTOLIC BLOOD PRESSURE: 90 MMHG | HEART RATE: 67 BPM | SYSTOLIC BLOOD PRESSURE: 167 MMHG | TEMPERATURE: 97.5 F | WEIGHT: 230 LBS | HEIGHT: 67 IN | OXYGEN SATURATION: 100 % | BODY MASS INDEX: 36.1 KG/M2

## 2024-09-07 DIAGNOSIS — G44.89 OTHER HEADACHE SYNDROME: Primary | ICD-10-CM

## 2024-09-07 PROCEDURE — 99284 EMERGENCY DEPT VISIT MOD MDM: CPT

## 2024-09-07 PROCEDURE — 2580000003 HC RX 258: Performed by: PHYSICIAN ASSISTANT

## 2024-09-07 PROCEDURE — 96361 HYDRATE IV INFUSION ADD-ON: CPT

## 2024-09-07 PROCEDURE — 96375 TX/PRO/DX INJ NEW DRUG ADDON: CPT

## 2024-09-07 PROCEDURE — 96374 THER/PROPH/DIAG INJ IV PUSH: CPT

## 2024-09-07 PROCEDURE — 6360000002 HC RX W HCPCS: Performed by: PHYSICIAN ASSISTANT

## 2024-09-07 RX ORDER — 0.9 % SODIUM CHLORIDE 0.9 %
1000 INTRAVENOUS SOLUTION INTRAVENOUS ONCE
Status: COMPLETED | OUTPATIENT
Start: 2024-09-07 | End: 2024-09-07

## 2024-09-07 RX ORDER — KETOROLAC TROMETHAMINE 30 MG/ML
30 INJECTION, SOLUTION INTRAMUSCULAR; INTRAVENOUS ONCE
Status: COMPLETED | OUTPATIENT
Start: 2024-09-07 | End: 2024-09-07

## 2024-09-07 RX ORDER — LORAZEPAM 2 MG/ML
1 INJECTION INTRAMUSCULAR ONCE
Status: COMPLETED | OUTPATIENT
Start: 2024-09-07 | End: 2024-09-07

## 2024-09-07 RX ORDER — METOCLOPRAMIDE HYDROCHLORIDE 5 MG/ML
10 INJECTION INTRAMUSCULAR; INTRAVENOUS ONCE
Status: COMPLETED | OUTPATIENT
Start: 2024-09-07 | End: 2024-09-07

## 2024-09-07 RX ADMIN — KETOROLAC TROMETHAMINE 30 MG: 30 INJECTION, SOLUTION INTRAMUSCULAR at 11:41

## 2024-09-07 RX ADMIN — SODIUM CHLORIDE 1000 ML: 9 INJECTION, SOLUTION INTRAVENOUS at 11:41

## 2024-09-07 RX ADMIN — LORAZEPAM 1 MG: 2 INJECTION INTRAMUSCULAR; INTRAVENOUS at 11:42

## 2024-09-07 RX ADMIN — METOCLOPRAMIDE 10 MG: 5 INJECTION, SOLUTION INTRAMUSCULAR; INTRAVENOUS at 11:42

## 2024-09-07 ASSESSMENT — PAIN - FUNCTIONAL ASSESSMENT: PAIN_FUNCTIONAL_ASSESSMENT: 0-10

## 2024-09-07 ASSESSMENT — PAIN DESCRIPTION - LOCATION: LOCATION: HEAD

## 2024-09-07 ASSESSMENT — PAIN SCALES - GENERAL: PAINLEVEL_OUTOF10: 7

## 2024-09-07 ASSESSMENT — PAIN DESCRIPTION - DESCRIPTORS: DESCRIPTORS: ACHING

## 2024-09-07 ASSESSMENT — VISUAL ACUITY: OU: 1

## 2024-09-07 ASSESSMENT — PAIN DESCRIPTION - ORIENTATION: ORIENTATION: LEFT

## 2024-09-07 NOTE — ED PROVIDER NOTES
[facility] EMERGENCY DEPT      EMERGENCY MEDICINE     Pt Name: Nikunj Harris  MRN: [id number]  Birthdate 1990  Date of evaluation: 9/7/2024  Provider: JOSEFA Dunlap    CHIEF COMPLAINT       Chief Complaint   Patient presents with    Headache     HISTORY OF PRESENT ILLNESS   Nikunj Harris is a pleasant 34 y.o. female who presents to the emergency department for headache.  It has been going on for the past 3 days.  Reports it as a left-sided headache that has some pain in the eye.  She has photophobia and phonophobia.  She denies nausea, vomiting, visual disturbances, aura, ataxia, dizziness, chest pain, shortness of breath, fever, neck pain.  She has tried Tylenol at home without relief.  She states laying in a dark room and resting helps her symptoms mildly.  She admits to a previous diagnosis of migraines when she was younger.  Says that she had multiple previous visits to the hospital for her migraines in which she received migraine cocktails which alleviated her symptoms.  She does not have any current medication management for migraines.  She reports pain a 7 out of 10.       PASTMEDICAL HISTORY     Past Medical History:   Diagnosis Date    Normocytic anemia 5/27/2021    Right upper lobe pulmonary nodule 2.2 mm pleural nodule 5/27/2021    Sickle cell anemia (HCC)     Has trait       Patient Active Problem List   Diagnosis Code   (none) - all problems resolved or deleted     SURGICAL HISTORY     No past surgical history on file.    CURRENT MEDICATIONS       Discharge Medication List as of 9/7/2024  1:15 PM        CONTINUE these medications which have NOT CHANGED    Details   famotidine (PEPCID) 20 MG tablet Take 1 tablet by mouth 2 times daily as needed (bloating, abdominal pain), Disp-180 tablet, R-1Normal             ALLERGIES     has No Known Allergies.    FAMILY HISTORY     She indicated that her mother is alive. She indicated that her father is alive. She indicated that her sister is  display       LABS: (none if blank)  Labs Reviewed - No data to display        (Any cultures that may have been sent were not resulted at the time of this patient visit)    MEDICAL DECISION MAKING / ED COURSE:     1) Number and Complexity of Problems            Problem List This Visit:         Chief Complaint   Patient presents with    Headache            Differential Diagnosis includes (but not limited to):  See MDM        Diagnoses Considered but I have low suspicion of:   SAH, cerebral aneurysm             Pertinent Comorbid Conditions:    Hypertension, obesity, sickle cell anemia, multiple previous episodes of migraine headaches.    2)  Data Reviewed (none if left blank)          My Independent interpretations:     EKG:      None    Imaging: None    Labs:      None                 Decision Rules/Clinical Scores utilized:  None            External Documentation Reviewed:         Previous patient encounter documents & history available on EMR was reviewed yes             See Formal Diagnostic Results above for the lab and radiology tests and orders.    3)  Treatment and Disposition         ED Reassessment:  Stable         Case discussed with (none if left blank)         Shared Decision-Making was performed and disposition discussed with the        Patient/Family and questions answered yes         Social determinants of health impacting treatment or disposition:  None         Code Status:  N/A      Summary of Patient Presentation:      [facility]  Number of Diagnoses or Management Options  Other headache syndrome  Diagnosis management comments: Differential diagnosis includes but is not limited to migraine headache, tension headache, cluster headache, pseudotumor cerebri, SAH.  Patient has a left-sided headache for the past 3 days.  She does admit to a previous diagnosis of migraines but has been symptom-free for many years.  She has had multiple hospital visits in the past for similar symptoms in which she received a

## 2024-09-07 NOTE — ED TRIAGE NOTES
Patient presents to ED from home with complaints of having a headache for 3 days. Patient states she has had some nausea and dizziness, and has been taking tylenol with little to no relief. Patient states she feels better with the light off. Patient is A&Ox4, respirations equal and unlabored, VSS.

## 2025-01-01 ENCOUNTER — HOSPITAL ENCOUNTER (EMERGENCY)
Age: 35
Discharge: HOME | End: 2025-01-01
Payer: COMMERCIAL

## 2025-01-01 ENCOUNTER — APPOINTMENT (OUTPATIENT)
Dept: ULTRASOUND IMAGING | Age: 35
End: 2025-01-01
Payer: COMMERCIAL

## 2025-01-01 VITALS
HEIGHT: 67 IN | HEART RATE: 67 BPM | DIASTOLIC BLOOD PRESSURE: 65 MMHG | TEMPERATURE: 97.7 F | SYSTOLIC BLOOD PRESSURE: 118 MMHG | WEIGHT: 220 LBS | OXYGEN SATURATION: 100 % | BODY MASS INDEX: 34.53 KG/M2 | RESPIRATION RATE: 18 BRPM

## 2025-01-01 DIAGNOSIS — N93.9 VAGINAL BLEEDING: ICD-10-CM

## 2025-01-01 DIAGNOSIS — O20.0 THREATENED MISCARRIAGE IN EARLY PREGNANCY: Primary | ICD-10-CM

## 2025-01-01 DIAGNOSIS — D25.9 UTERINE LEIOMYOMA, UNSPECIFIED LOCATION: ICD-10-CM

## 2025-01-01 LAB
ALBUMIN SERPL BCG-MCNC: 4 G/DL (ref 3.5–5.1)
ALP SERPL-CCNC: 71 U/L (ref 38–126)
ALT SERPL W/O P-5'-P-CCNC: 9 U/L (ref 11–66)
ANION GAP SERPL CALC-SCNC: 11 MEQ/L (ref 8–16)
AST SERPL-CCNC: 15 U/L (ref 5–40)
BASOPHILS ABSOLUTE: 0 THOU/MM3 (ref 0–0.1)
BASOPHILS NFR BLD AUTO: 0.4 %
BILIRUB CONJ SERPL-MCNC: < 0.1 MG/DL (ref 0.1–13.8)
BILIRUB SERPL-MCNC: 0.2 MG/DL (ref 0.3–1.2)
BILIRUB UR QL STRIP.AUTO: NEGATIVE
BUN SERPL-MCNC: 6 MG/DL (ref 7–22)
CALCIUM SERPL-MCNC: 8.8 MG/DL (ref 8.5–10.5)
CHARACTER UR: CLEAR
CHLORIDE SERPL-SCNC: 105 MEQ/L (ref 98–111)
CO2 SERPL-SCNC: 21 MEQ/L (ref 23–33)
COLOR, UA: YELLOW
CREAT SERPL-MCNC: 0.7 MG/DL (ref 0.4–1.2)
DEPRECATED RDW RBC AUTO: 39.5 FL (ref 35–45)
EOSINOPHIL NFR BLD AUTO: 0.2 %
EOSINOPHILS ABSOLUTE: 0 THOU/MM3 (ref 0–0.4)
ERYTHROCYTE [DISTWIDTH] IN BLOOD BY AUTOMATED COUNT: 13.5 % (ref 11.5–14.5)
GFR SERPL CREATININE-BSD FRML MDRD: > 90 ML/MIN/1.73M2
GLUCOSE SERPL-MCNC: 93 MG/DL (ref 70–108)
GLUCOSE UR QL STRIP.AUTO: NEGATIVE MG/DL
HCG INTACT+B SERPL-ACNC: 2666 MIU/ML (ref 0–5)
HCT VFR BLD AUTO: 35.4 % (ref 37–47)
HGB BLD-MCNC: 11.5 GM/DL (ref 12–16)
HGB UR QL STRIP.AUTO: NEGATIVE
IMM GRANULOCYTES # BLD AUTO: 0.01 THOU/MM3 (ref 0–0.07)
IMM GRANULOCYTES NFR BLD AUTO: 0.2 %
KETONES UR QL STRIP.AUTO: NEGATIVE
LYMPHOCYTES ABSOLUTE: 2.1 THOU/MM3 (ref 1–4.8)
LYMPHOCYTES NFR BLD AUTO: 38.1 %
MCH RBC QN AUTO: 26.5 PG (ref 26–33)
MCHC RBC AUTO-ENTMCNC: 32.5 GM/DL (ref 32.2–35.5)
MCV RBC AUTO: 81.6 FL (ref 81–99)
MONOCYTES ABSOLUTE: 0.4 THOU/MM3 (ref 0.4–1.3)
MONOCYTES NFR BLD AUTO: 6.9 %
NEUTROPHILS ABSOLUTE: 3 THOU/MM3 (ref 1.8–7.7)
NEUTROPHILS NFR BLD AUTO: 54.2 %
NITRITE UR QL STRIP: NEGATIVE
NRBC BLD AUTO-RTO: 0 /100 WBC
OSMOLALITY SERPL CALC.SUM OF ELEC: 271.1 MOSMOL/KG (ref 275–300)
PH UR STRIP.AUTO: 6.5 [PH] (ref 5–9)
PLATELET # BLD AUTO: 276 THOU/MM3 (ref 130–400)
PMV BLD AUTO: 9.6 FL (ref 9.4–12.4)
POTASSIUM SERPL-SCNC: 3.9 MEQ/L (ref 3.5–5.2)
PROT SERPL-MCNC: 7.1 G/DL (ref 6.1–8)
PROT UR STRIP.AUTO-MCNC: NEGATIVE MG/DL
RBC # BLD AUTO: 4.34 MILL/MM3 (ref 4.2–5.4)
SODIUM SERPL-SCNC: 137 MEQ/L (ref 135–145)
SP GR UR REFRACT.AUTO: 1.01 (ref 1–1.03)
UROBILINOGEN, URINE: 0.2 EU/DL (ref 0–1)
WBC # BLD AUTO: 5.5 THOU/MM3 (ref 4.8–10.8)
WBC #/AREA URNS HPF: NEGATIVE /[HPF]

## 2025-01-01 PROCEDURE — 76817 TRANSVAGINAL US OBSTETRIC: CPT

## 2025-01-01 PROCEDURE — 84702 CHORIONIC GONADOTROPIN TEST: CPT

## 2025-01-01 PROCEDURE — 36415 COLL VENOUS BLD VENIPUNCTURE: CPT

## 2025-01-01 PROCEDURE — 85025 COMPLETE CBC W/AUTO DIFF WBC: CPT

## 2025-01-01 PROCEDURE — 82248 BILIRUBIN DIRECT: CPT

## 2025-01-01 PROCEDURE — 81003 URINALYSIS AUTO W/O SCOPE: CPT

## 2025-01-01 PROCEDURE — 99284 EMERGENCY DEPT VISIT MOD MDM: CPT

## 2025-01-01 PROCEDURE — 80053 COMPREHEN METABOLIC PANEL: CPT

## 2025-01-01 NOTE — DISCHARGE INSTRUCTIONS
Follow-up with either your regular physician or the OB/GYN clinic.  You should have a repeat blood pregnancy test done in 48 hours.    Your quantitative beta-hCG was 2666    Pelvic rest, no sexual activity or strenuous exertion until bleeding has ceased    Call your regular physician for follow-up tomorrow.  You can also call the OB/GYN clinic.    Discharge warning    Please remember that examination and testing performed in the emergency department is not a comprehensive evaluation of all medical conditions and does not replace the need to follow up with your primary care provider.  In the emergency department, we are only able to evaluate your symptoms in the current condition, but symptoms may change or worsen.  Although you are felt safe to be discharged today, if your symptoms persist or change, you need to be re-evaluated by your regular/primary care doctor as soon as possible.  If you are unable to make appointment with your regular doctor, please come back to the ER to be re-evaluated.

## 2025-01-01 NOTE — ED PROVIDER NOTES
and/or family is acceptable to this plan. I did have a very detailed discussion with the patient regarding my exam findings, diagnostics as well as my differential diagnosis. I did inform them to carefully monitor their symptoms at home. I also informed them when they should follow up with their PCP, a specialist or when they should return to the emergency department. Red flag signs and symptoms that would warrant immediate need to return to the Emergency Department are discussed. Patient verbalizes understanding of return precautions, denies further questions, and is agreeable to being discharged at this time. The patient understood these instructions and I answered all necessary questions.     Please note that this medical record has been dictated using voice recognition software, and transcription errors are possible. Occasional wrong-word or \"sound-alike\" substitutions may have occurred due to the inherent limitations of voice recognition technology. Please read the chart carefully and recognize, using context, where these substitutions have occurred.        /  ED Course as of 01/01/25 0946   Wed Jan 01, 2025   0723 Patient's blood type O+ by chart review [RH]   0901 Patient reevaluated, resting comfortably in the room.  No distress.    Independent interpretation of the transvaginal ultrasound shows an intrauterine gestational sac.  I did review radiology interpretation.  No other acute finding.  Uterine fibroid was also noted [RH]   0930 Beta-hCG is 2666 [RH]      ED Course User Index  [RH] Agustin Olson PA     Vitals Reviewed:    Vitals:    01/01/25 0726 01/01/25 0727 01/01/25 0936   BP:  (!) 161/80 118/65   Pulse:  93 67   Resp: 16  18   Temp: 97.7 °F (36.5 °C)     SpO2: 100%  100%   Weight: 99.8 kg (220 lb)     Height: 1.702 m (5' 7\")         The patient was seen and examined. Appropriate diagnostic testing was performed and results reviewed with the patient.      The results of pertinent diagnostic

## 2025-01-01 NOTE — ED TRIAGE NOTES
Pt to ED with vaginal bleeding \"off and on\" for the past few days. Pt states that she had a positive pregnancy test on 12/23/24. Pt states her last menstrual cycle was 11/23/24. Pt denies any abnormal discharge. States that she has some slight pelvic cramping. Pt states she has not been to the OB yet.